# Patient Record
Sex: FEMALE | Race: WHITE | NOT HISPANIC OR LATINO
[De-identification: names, ages, dates, MRNs, and addresses within clinical notes are randomized per-mention and may not be internally consistent; named-entity substitution may affect disease eponyms.]

---

## 2020-01-23 PROBLEM — Z00.00 ENCOUNTER FOR PREVENTIVE HEALTH EXAMINATION: Status: ACTIVE | Noted: 2020-01-23

## 2020-01-27 ENCOUNTER — APPOINTMENT (OUTPATIENT)
Dept: RADIATION ONCOLOGY | Facility: CLINIC | Age: 75
End: 2020-01-27
Payer: MEDICARE

## 2020-01-27 VITALS
HEART RATE: 64 BPM | DIASTOLIC BLOOD PRESSURE: 88 MMHG | TEMPERATURE: 98 F | BODY MASS INDEX: 33.13 KG/M2 | HEIGHT: 62 IN | WEIGHT: 180 LBS | SYSTOLIC BLOOD PRESSURE: 138 MMHG | OXYGEN SATURATION: 99 % | RESPIRATION RATE: 12 BRPM

## 2020-01-27 DIAGNOSIS — Z87.39 PERSONAL HISTORY OF OTHER DISEASES OF THE MUSCULOSKELETAL SYSTEM AND CONNECTIVE TISSUE: ICD-10-CM

## 2020-01-27 DIAGNOSIS — Z85.118 PERSONAL HISTORY OF OTHER MALIGNANT NEOPLASM OF BRONCHUS AND LUNG: ICD-10-CM

## 2020-01-27 DIAGNOSIS — M19.90 UNSPECIFIED OSTEOARTHRITIS, UNSPECIFIED SITE: ICD-10-CM

## 2020-01-27 DIAGNOSIS — Z86.79 PERSONAL HISTORY OF OTHER DISEASES OF THE CIRCULATORY SYSTEM: ICD-10-CM

## 2020-01-27 DIAGNOSIS — Z86.2 PERSONAL HISTORY OF DISEASES OF THE BLOOD AND BLOOD-FORMING ORGANS AND CERTAIN DISORDERS INVOLVING THE IMMUNE MECHANISM: ICD-10-CM

## 2020-01-27 DIAGNOSIS — R91.1 SOLITARY PULMONARY NODULE: ICD-10-CM

## 2020-01-27 DIAGNOSIS — Z86.39 PERSONAL HISTORY OF OTHER ENDOCRINE, NUTRITIONAL AND METABOLIC DISEASE: ICD-10-CM

## 2020-01-27 PROCEDURE — 99204 OFFICE O/P NEW MOD 45 MIN: CPT | Mod: 25

## 2020-01-27 RX ORDER — AMOXICILLIN AND CLAVULANATE POTASSIUM 500; 125 MG/1; 1/1
TABLET, FILM COATED ORAL
Refills: 0 | Status: COMPLETED | COMMUNITY
End: 2020-01-27

## 2020-01-27 NOTE — REVIEW OF SYSTEMS
[Fatigue: Grade 1 - Fatigue relieved by rest] : Fatigue: Grade 1 - Fatigue relieved by rest [Breast Pain: Grade 0] : Breast Pain: Grade 0 [Cough: Grade 0] : Cough: Grade 0 [Dyspnea: Grade 1 - Shortness of breath with moderate exertion] : Dyspnea: Grade 1 - Shortness of breath with moderate exertion [Hiccups: Grade 0] : Hiccups: Grade 0 [Hoarseness: Grade 0] : Hoarseness: Grade 0 [Hypoxia: Grade 0] : Hypoxia: Grade 0 [Pharyngeal Mucositis: Grade 0] : Pharyngeal Mucositis: Grade 0 [Pneumonitis: Grade 0] : Pneumonitis: Grade 0 [Voice Alteration: Grade 0] : Voice Alteration: Grade 0 [Negative] : Allergic/Immunologic [FreeTextEntry4] : runny nose [FreeTextEntry9] : right-sided back pain [FreeTextEntry3] : "8/10" [FreeTextEntry2] : ELAINE

## 2020-01-27 NOTE — OB/GYN HISTORY
[Currently In Menopause] : currently in menopause [Menopause Age: ____] : patient was [unfilled] years old at menopause [___] : Living: [unfilled] [History of Hormone Replacement Therapy] : no history of hormone replacement therapy

## 2020-01-27 NOTE — DISEASE MANAGEMENT
[Clinical] : TNM Stage: c [I] : I [FreeTextEntry4] : TBD/pending surgical staging [TTNM] : 1 [NTNM] : 0 [MTNM] : 0

## 2020-01-27 NOTE — HISTORY OF PRESENT ILLNESS
[FreeTextEntry1] : Ms. Cordoba is a 74 year old woman with a history of chemotherapy for colonic ectasias with bleeding, lung cancer (pT2a 3.8 cm SCC, poorly differentiated) s/p RLL lobectomy in May 2019 now with an invasive lobular cancer of the right breast s/p biopsy pending surgical staging.\par \par 12/13/19- Routine mammogram- ordered by Primary MD- Lorraine Mir- noted to have suspicious lesion in right breast. Upper outer quadrant right breast, irregular nodule measures 0.7cm. BI-RADS- 0 - incomplete need additional imaging \par \par 12/26/19- Bilateral u/s revealed a 6mm hypoechoic lesion in the area of concern 10:00 in the right  6mm from the nipple\par \par 1/10/20- u/s guided core bx was performed- invasive lobular carcinoma, ER/MD +, HER-2 negative\par Right breast 10:00 6cm- invasive lobular carcinoma, classic type, grade 6/9, measures up to 6mm in greatest length in a single core\par \par 1/16/20 Dr. Woodard discussed with pt the options of breast conserving vs total mastectomy with or without reconstruction, and if reconstruction then implants vs tissue transfer. If margins were positive for partial mastectomy additional surgery would be required. Dr. Woodard  offered the patient a sentinel node biopsy procedure. If positive, for mastectomy, axillary dissection will occur. If positive for lumpectomy procedures no axillary dissection,since pt will get axillary radiation. \par \par 1/20/20- MRI- Abnormal suspicious enhancement  around the biopsy clip in the right breast 10:00 mid third measuring about 9mm.  Linear enhancement extending posteriorly along the inferior aspect of the nodule measuring 1.2cm. No lymphadenopathy. \par \par HX- lung cancer and RLL lobectomy 5/2019 by Dr. Hdz- Pt c/o pain '8/10" in right chest wall area when touching the area- taking low dose Naltrexone once a day. Pt stated Dr. Asif suggested chemotherapy- pt didn't'’t want to get it. \par \par Pt here today c/o "head cold," denies cough, no fever, runny nose. Pt concerned about being allowed for surgery Wednesday, pt called Dr. Woodard's office, waiting call back. Pt is scheduled for surgery Wednesday 1/29/20- Lumpectomy \par \par Pt c/o ELAINE since lunch cancer DX

## 2020-01-27 NOTE — PHYSICAL EXAM
[Normal] : oriented to person, place and time, the affect was normal, the mood was normal and not anxious [de-identified] : no lumps or masses appreciated in breasts bilaterally

## 2020-01-27 NOTE — VITALS
[Maximal Pain Intensity: 8/10] : 8/10 [Least Pain Intensity: 8/10] : 8/10 [Pain Location: ___] : Pain Location: [unfilled] [Pain Interferes with ADLs] : Pain interferes with activities of daily living. [80: Normal activity with effort; some signs or symptoms of disease.] : 80: Normal activity with effort; some signs or symptoms of disease.  [ECOG Performance Status: 1 - Restricted in physically strenuous activity but ambulatory and able to carry out work of a light or sedentary nature] : Performance Status: 1 - Restricted in physically strenuous activity but ambulatory and able to carry out work of a light or sedentary nature, e.g., light house work, office work [Date: ____________] : Patient's last distress assessment performed on [unfilled]. [9 - Distress Level] : Distress Level: 9 [80: Active, but tires more quickly] : 80: Active, but tires more quickly [FreeTextEntry7] : health and wellness information given to pt

## 2020-02-18 ENCOUNTER — APPOINTMENT (OUTPATIENT)
Dept: RADIATION ONCOLOGY | Facility: CLINIC | Age: 75
End: 2020-02-18
Payer: MEDICARE

## 2020-02-18 VITALS
HEIGHT: 62 IN | HEART RATE: 64 BPM | WEIGHT: 186 LBS | TEMPERATURE: 98.2 F | OXYGEN SATURATION: 99 % | BODY MASS INDEX: 34.23 KG/M2 | DIASTOLIC BLOOD PRESSURE: 88 MMHG | SYSTOLIC BLOOD PRESSURE: 158 MMHG | RESPIRATION RATE: 14 BRPM

## 2020-02-18 PROCEDURE — 99214 OFFICE O/P EST MOD 30 MIN: CPT

## 2020-02-18 NOTE — PHYSICAL EXAM
[Normal] : oriented to person, place and time, the affect was normal, the mood was normal and not anxious [Breast Appearance] : normal in appearance [Symmetric] : breasts are symmetric [Breast Abnormal Lactation (Galactorrhea)] : no nipple discharge [No UE Edema] : there is no upper extremity edema [de-identified] : right breast scars healing well

## 2020-02-18 NOTE — DISEASE MANAGEMENT
[Clinical] : TNM Stage: c [IB] : IB [FreeTextEntry4] : TBD/pending surgical staging [TTNM] : 1b [NTNM] : 1mi [MTNM] : 0

## 2020-02-18 NOTE — VITALS
[Maximal Pain Intensity: 8/10] : 8/10 [Pain Duration: ___] : Pain duration: [unfilled] [Least Pain Intensity: 8/10] : 8/10 [Pain Location: ___] : Pain Location: [unfilled] [NoTreatment Scheduled] : no treatment scheduled [80: Normal activity with effort; some signs or symptoms of disease.] : 80: Normal activity with effort; some signs or symptoms of disease.  [80: Active, but tires more quickly] : 80: Active, but tires more quickly [ECOG Performance Status: 2 - Ambulatory and capable of all self care but unable to carry out any work activities] : Performance Status: 2 - Ambulatory and capable of all self care but unable to carry out any work activities. Up and about more than 50% of waking hours

## 2020-02-18 NOTE — REVIEW OF SYSTEMS
[Fatigue: Grade 1 - Fatigue relieved by rest] : Fatigue: Grade 1 - Fatigue relieved by rest [Breast Pain: Grade 0] : Breast Pain: Grade 0 [Insomnia: Grade 1 - Mild difficulty falling asleep, staying asleep or waking up early] : Insomnia: Grade 1 - Mild difficulty falling asleep, staying asleep or waking up early [Cough: Grade 0] : Cough: Grade 0 [Dyspnea: Grade 1 - Shortness of breath with moderate exertion] : Dyspnea: Grade 1 - Shortness of breath with moderate exertion [Hiccups: Grade 0] : Hiccups: Grade 0 [Hoarseness: Grade 0] : Hoarseness: Grade 0 [Hypoxia: Grade 0] : Hypoxia: Grade 0 [Pharyngeal Mucositis: Grade 0] : Pharyngeal Mucositis: Grade 0 [Pneumonitis: Grade 0] : Pneumonitis: Grade 0 [Voice Alteration: Grade 1 - Mild or intermittent change from normal voice] : Voice Alteration: Grade 1 - Mild or intermittent change from normal voice [Pruritus: Grade 0] : Pruritus: Grade 0 [Fatigue] : fatigue [Negative] : Heme/Lymph [FreeTextEntry9] : right chest wall pain [FreeTextEntry3] : "7/10" [FreeTextEntry2] : ELAINE

## 2020-02-18 NOTE — HISTORY OF PRESENT ILLNESS
[FreeTextEntry1] : Ms. Cordoba is a 74 year old woman with a history of chemotherapy for colonic ectasias with bleeding, lung cancer (pT2a 3.8 cm SCC, poorly differentiated) s/p RLL lobectomy in May 2019 now with a stage Ib, [id number] invasive lobular carcinoma intermediate grade of the right breast.\par \par She had a CT Chest in February 2019 noting a 1.8 cm RLL nodule. She was noted to have a CT in April 2019 after abdominal pain noting a RLL nodule measuring 2.2 cm slightly increased from 1.8 cm in February. She underwent right lower lobectomy on 5/9/19 by Dr. Fuentes noting a 3.8 cm SCC of the right lower lobe, grade 3, with negative margins >4 cm (bronchial), and 5 nodes negative (levels 7, 10, 11, and 12).  \par \par Pt c/o pain '8/10" in right chest wall area when touching the area- taking low dose Naltrexone once a day. Pt stated Dr. Asif suggested chemotherapy- pt didn't want to get it. \par \par She underwent last mammo in March 2016.  Underwent screening mammogram on 12/13/19 noting an irregular nodule in the right upper outer quadrant measuring 0.7 cm that is new.\par \par Biopsy on 1/10/20 of the right breast 10 o’clock 6 cm from nipple noted invasive lobular carcinoma, calsscic type, grade 6/9, up to 6 mm in a single core, ER+/KS+/Her2 IHC equivocal on [facility] review.  Initial caremount noted invasive lobular carcinoma classic type, overall grade 2 up to 6 mm in a single core.\par \par MRI Breast on 1/20/20 noted right breast 10 o’clock axis mid third irregular enhancing lesion and/or post-biopsy change measuring 9 mm.  Along the posterior inferior aspect there is linear enhancement measuring 1.2 cm with clip in the middle of the nodule.  No lymphadenopathy was noted.\par \par \par 1/29/20- Dr. Woodard- Right Localized lumpectomy with additional margins and axillary sentinel lymph node biopsy\par FINAL PATHOLOGIC DIAGNOSIS\par A. RIGHT BREAST CANCER: LOCALIZED LUMPECTOMY\par -  INVASIVE LOBULAR CARCINOMA\par 	-  CLASSIC TYPE\par 	-  7 MM\par 	-  HISTOLOGIC GRADE 6/9 (TUBULES 3/3, NUCLEI 2/3, MITOSES 1/3)\par 	-  SINGLE FOCUS SUSPICIOUS FOR LYMPHOVASCULAR INVASION\par 	-  NEGATIVE MARGINS\par 	-  CLOSEST MARGIN: < 1 MM SUPERIOR\par -  REMAINING MARGINS: 7 MM LATERAL, 8 MM INFERIOR, > 10 MM ANTERIOR, POSTERIOR AND MEDIAL\par -  ASSOCIATED HEALING BIOPSY SITE CHANGES\par -  RESULTS OF E-CADHERIN STAIN TO SUPPORT LOBULAR ORIGIN WILL BE REPORTED IN AN ADDENDUM\par -  ER, KS , HER-2 AND KI-67 RESULTS WILL BE REPORTED IN AN ADDENDUM\par B. RIGHT BREAST SUPERIOR MARGIN (ADDITIONAL 1.2 CM MARGIN):\par -  LOBULAR CARCINOMA IN SITU (LCIS), CLASSIC TYPE, SMALL FOCUS\par -  HEALING BIOPSY SITE CHANGES, SMALL FOCUS\par -  NEGATIVE FOR INVASIVE CARCINOMA\par C. RIGHT BREAST MEDIAL MARGIN (ADDITIONAL 1.6 CM MARGIN):\par -  BENIGN UNREMARKABLE BREAST TISSUE.\par D. RIGHT BREAST INFERIOR MARGIN (ADDITIONAL 0.9 CM MARGIN):\par -  BENIGN UNREMARKABLE BREAST TISSUE.\par E. RIGHT BREAST LATERAL MARGIN (ADDITIONAL 1.5 CM MARGIN):\par -  BENIGN UNREMARKABLE BREAST TISSUE.\par F. RIGHT BREAST ANTERIOR MARGIN (ADDITIONAL 1.5 CM MARGIN):\par -  BENIGN UNREMARKABLE BREAST TISSUE.\par G. RIGHT BREAST POSTERIOR MARGIN (ADDITIONAL 1.5 CM MARGIN):\par -  BENIGN BREAST TISSUE WITH FOCAL FLAT EPITHELIAL ATYPIA (FEA).\par H. RIGHT AXILLARY SENTINEL NODE #1:\par -  MICROMETASTATIC CARCINOMA (1.1 MM FOCUS) IN A SINGLE 1.8 CM LYMPH NODE (1/1).\par -  NEGATIVE FOR EXTRANODAL EXTENSION.\par \par Cancer Staging \par Pathologic Stage Classification (AJCC 8th ed):  pT1b N1mi(sn) \par \par ADDENDUM 2\par Part H:  Right axillary sentinel lymph node #1\par Her-2 by FISH results on the metastatic carcinoma as reported by [facility] (See [facility] report for complete details)\par HER2 by FISH Negative/Not Amplified \par HER2:CEP-17 Ratio 1.0:1\par Average HER2 signal 2.3 \par \par ADDENDUM 1\par Results of immunohistochemical stains.  \par Part A:  Right breast cancer (Block A10)\par E-cadherin stain is negative, supporting lobular phenotype \par Estrogen receptor (ER) POSITIVE (>95%, strong intensity) \par Progesterone receptor (KS) POSITIVE (>95%, strong intensity) \par HER-2 NEGATIVE (score 1+) \par Ki-67 HIGH (~25%)\par \par Part H:  Micrometastatic carcinoma in right axillary sentinel lymph node (Block H1)\par Estrogen receptor (ER) POSITIVE (>95%, strong intensity)\par Progesterone receptor (KS) POSITIVE (>95%, strong intensity)\par HER-2  EQUIVOCAL (Score 1-2+)\par HER-2 by FISH pending\par Ki-67  LOW (< 10%) \par \par \par Here to discuss radiation post lumpectomy.  Is doing well.  Is pending oncotype/appointment with Dr. Asif.

## 2020-03-09 ENCOUNTER — APPOINTMENT (OUTPATIENT)
Dept: RADIATION ONCOLOGY | Facility: CLINIC | Age: 75
End: 2020-03-09
Payer: MEDICARE

## 2020-03-09 PROCEDURE — 99212 OFFICE O/P EST SF 10 MIN: CPT

## 2020-03-09 NOTE — HISTORY OF PRESENT ILLNESS
[FreeTextEntry1] : 74 year old woman with a history of chemotherapy for colonic ectasias with bleeding, lung cancer (pT2a 3.8 cm SCC, poorly differentiated) s/p RLL lobectomy in May 2019 now with a stage Ib, [id number] invasive lobular carcinoma intermediate grade of the right breast.  We had seen her 2 weeks ago and had gone over the pathology findings and had recommended radiation therapy to her right breast and to axilla to improve her local control.  We had initially favored treating her in prone position.  However patient had difficulty lying the position and the decision was made to treat her in supine position.  She was simulated in supine position.  Patient had concerns regarding her treatment in view of her prior history of lung cancer and has scheduled a visit to discuss her concerns.\par \par Pt here to further discuss treatment options/plan

## 2020-03-09 NOTE — PHYSICAL EXAM
[Normal] : oriented to person, place and time, the affect was normal, the mood was normal and not anxious [de-identified] : No erythema of the skin seen

## 2020-03-19 ENCOUNTER — APPOINTMENT (OUTPATIENT)
Dept: RADIATION ONCOLOGY | Facility: CLINIC | Age: 75
End: 2020-03-19

## 2020-03-24 VITALS
RESPIRATION RATE: 14 BRPM | DIASTOLIC BLOOD PRESSURE: 82 MMHG | BODY MASS INDEX: 34.04 KG/M2 | HEIGHT: 62 IN | HEART RATE: 68 BPM | WEIGHT: 185 LBS | SYSTOLIC BLOOD PRESSURE: 156 MMHG | OXYGEN SATURATION: 99 % | TEMPERATURE: 98.6 F

## 2020-03-24 NOTE — PHYSICAL EXAM
[Normal] : oriented to person, place and time, the affect was normal, the mood was normal and not anxious [de-identified] : No erythema seen

## 2020-03-24 NOTE — REVIEW OF SYSTEMS
[Fatigue: Grade 1 - Fatigue relieved by rest] : Fatigue: Grade 1 - Fatigue relieved by rest [Breast Pain: Grade 0] : Breast Pain: Grade 0 [Cough: Grade 0] : Cough: Grade 0 [Dyspnea: Grade 1 - Shortness of breath with moderate exertion] : Dyspnea: Grade 1 - Shortness of breath with moderate exertion [Hiccups: Grade 0] : Hiccups: Grade 0 [Hoarseness: Grade 0] : Hoarseness: Grade 0 [Hypoxia: Grade 0] : Hypoxia: Grade 0 [Pharyngeal Mucositis: Grade 0] : Pharyngeal Mucositis: Grade 0 [Pneumonitis: Grade 0] : Pneumonitis: Grade 0 [Voice Alteration: Grade 0] : Voice Alteration: Grade 0 [Pruritus: Grade 0] : Pruritus: Grade 0 [Dermatitis Radiation: Grade 0] : Dermatitis Radiation: Grade 0 [Negative] : Allergic/Immunologic [FreeTextEntry2] : ELAINE  [FreeTextEntry4] : runny nose [FreeTextEntry9] : right-sided back pain

## 2020-03-24 NOTE — DISEASE MANAGEMENT
[Clinical] : TNM Stage: c [I] : I [FreeTextEntry4] : TBD/pending surgical staging [TTNM] : 1 [NTNM] : 0 [MTNM] : 0 [de-identified] : completed 2 out of 25 fractions with a total dose of 400 cGy

## 2020-03-24 NOTE — HISTORY OF PRESENT ILLNESS
[FreeTextEntry1] : Ms. Cordoba is a 74 year old woman with a history of chemotherapy for colonic ectasias with bleeding, lung cancer (pT2a 3.8 cm SCC, poorly differentiated) s/p RLL lobectomy in May 2019 now with an invasive lobular cancer of the right breast s/p biopsy pending surgical staging.\par \par 12/13/19- Routine mammogram- ordered by Primary MD- Lorraine Mir- noted to have suspicious lesion in right breast. Upper outer quadrant right breast, irregular nodule measures 0.7cm. BI-RADS- 0 - incomplete need additional imaging \par \par 12/26/19- Bilateral u/s revealed a 6mm hypoechoic lesion in the area of concern 10:00 in the right  6mm from the nipple\par \par 1/10/20- u/s guided core bx was performed- invasive lobular carcinoma, ER/MI +, HER-2 negative\par Right breast 10:00 6cm- invasive lobular carcinoma, classic type, grade 6/9, measures up to 6mm in greatest length in a single core\par \par 1/16/20 Dr. Woodard discussed with pt the options of breast conserving vs total mastectomy with or without reconstruction, and if reconstruction then implants vs tissue transfer. If margins were positive for partial mastectomy additional surgery would be required. Dr. Woodard  offered the patient a sentinel node biopsy procedure. If positive, for mastectomy, axillary dissection will occur. If positive for lumpectomy procedures no axillary dissection,since pt will get axillary radiation. \par \par 1/20/20- MRI- Abnormal suspicious enhancement  around the biopsy clip in the right breast 10:00 mid third measuring about 9mm.  Linear enhancement extending posteriorly along the inferior aspect of the nodule measuring 1.2cm. No lymphadenopathy. \par \par HX- lung cancer and RLL lobectomy 5/2019 by Dr. Hdz- Pt c/o pain '8/10" in right chest wall area when touching the area- taking low dose Naltrexone once a day. Pt stated Dr. Asif suggested chemotherapy- pt didn't'’t want to get it. \par \par pt presents today for a OTV. She is feeling well just c/o right shoulder pain possibly due to positioning on the table. pt using cetaphil cream BID \par \par

## 2020-03-24 NOTE — VITALS
[Maximal Pain Intensity: 0/10] : 0/10 [Least Pain Intensity: 0/10] : 0/10 [Pain Location: ___] : Pain Location: [unfilled] [Pain Interferes with ADLs] : Pain interferes with activities of daily living. [OTC] : OTC [80: Normal activity with effort; some signs or symptoms of disease.] : 80: Normal activity with effort; some signs or symptoms of disease.  [80: Active, but tires more quickly] : 80: Active, but tires more quickly [ECOG Performance Status: 1 - Restricted in physically strenuous activity but ambulatory and able to carry out work of a light or sedentary nature] : Performance Status: 1 - Restricted in physically strenuous activity but ambulatory and able to carry out work of a light or sedentary nature, e.g., light house work, office work [Date: ____________] : Patient's last distress assessment performed on [unfilled]. [9 - Distress Level] : Distress Level: 9 [FreeTextEntry7] : health and wellness information given to pt

## 2020-03-31 VITALS
OXYGEN SATURATION: 96 % | BODY MASS INDEX: 34.41 KG/M2 | HEART RATE: 66 BPM | HEIGHT: 62 IN | TEMPERATURE: 98.5 F | RESPIRATION RATE: 14 BRPM | DIASTOLIC BLOOD PRESSURE: 83 MMHG | WEIGHT: 187 LBS | SYSTOLIC BLOOD PRESSURE: 157 MMHG

## 2020-03-31 NOTE — PHYSICAL EXAM
[Normal] : oriented to person, place and time, the affect was normal, the mood was normal and not anxious [de-identified] : Erythema of the breast noted

## 2020-03-31 NOTE — HISTORY OF PRESENT ILLNESS
[FreeTextEntry1] : Ms. Cordoba is a 74 year old woman with a history of chemotherapy for colonic ectasias with bleeding, lung cancer (pT2a 3.8 cm SCC, poorly differentiated) s/p RLL lobectomy in May 2019 now with an invasive lobular cancer of the right breast s/p biopsy pending surgical staging.\par \par 12/13/19- Routine mammogram- ordered by Primary MD- Lorraine Mir- noted to have suspicious lesion in right breast. Upper outer quadrant right breast, irregular nodule measures 0.7cm. BI-RADS- 0 - incomplete need additional imaging \par \par 12/26/19- Bilateral u/s revealed a 6mm hypoechoic lesion in the area of concern 10:00 in the right  6mm from the nipple\par \par 1/10/20- u/s guided core bx was performed- invasive lobular carcinoma, ER/MA +, HER-2 negative\par Right breast 10:00 6cm- invasive lobular carcinoma, classic type, grade 6/9, measures up to 6mm in greatest length in a single core\par \par 1/16/20 Dr. Woodard discussed with pt the options of breast conserving vs total mastectomy with or without reconstruction, and if reconstruction then implants vs tissue transfer. If margins were positive for partial mastectomy additional surgery would be required. Dr. Woodard  offered the patient a sentinel node biopsy procedure. If positive, for mastectomy, axillary dissection will occur. If positive for lumpectomy procedures no axillary dissection,since pt will get axillary radiation. \par \par 1/20/20- MRI- Abnormal suspicious enhancement  around the biopsy clip in the right breast 10:00 mid third measuring about 9mm.  Linear enhancement extending posteriorly along the inferior aspect of the nodule measuring 1.2cm. No lymphadenopathy. \par \par HX- lung cancer and RLL lobectomy 5/2019 by Dr. Hdz- Pt c/o pain '8/10" in right chest wall area when touching the area- taking low dose Naltrexone once a day. Pt stated Dr. Asif suggested chemotherapy- pt didn't'’t want to get it. \par \par pt presents today for a OTV. She is feeling well just c/o some right breast soreness and inflammation. Her skin is slightly red today.  pt using cetaphil cream BID. Is c/o of a mild headache the last couple treatments. \par \par

## 2020-03-31 NOTE — REVIEW OF SYSTEMS
[Fatigue: Grade 1 - Fatigue relieved by rest] : Fatigue: Grade 1 - Fatigue relieved by rest [Breast Pain: Grade 1 - Mild pain] : Breast Pain: Grade 1 - Mild pain [Headache: Grade 1 - Mild pain] : Headache: Grade 1 - Mild pain [Cough: Grade 0] : Cough: Grade 0 [Dyspnea: Grade 1 - Shortness of breath with moderate exertion] : Dyspnea: Grade 1 - Shortness of breath with moderate exertion [Hiccups: Grade 0] : Hiccups: Grade 0 [Hoarseness: Grade 0] : Hoarseness: Grade 0 [Hypoxia: Grade 0] : Hypoxia: Grade 0 [Pharyngeal Mucositis: Grade 0] : Pharyngeal Mucositis: Grade 0 [Pneumonitis: Grade 0] : Pneumonitis: Grade 0 [Voice Alteration: Grade 0] : Voice Alteration: Grade 0 [Pruritus: Grade 0] : Pruritus: Grade 0 [Dermatitis Radiation: Grade 1 - Faint erythema or dry desquamation] : Dermatitis Radiation: Grade 1 - Faint erythema or dry desquamation [Negative] : Allergic/Immunologic [FreeTextEntry7] : slight soreness and inflammation  [FreeTextEntry2] : ELAINE  [FreeTextEntry6] : slight redness  [FreeTextEntry4] : runny nose [FreeTextEntry9] : right-sided back pain

## 2020-03-31 NOTE — DISEASE MANAGEMENT
[Clinical] : TNM Stage: c [I] : I [FreeTextEntry4] : TBD/pending surgical staging [TTNM] : 1 [NTNM] : 0 [MTNM] : 0 [de-identified] : completed 7 out of 25 fractions with a total dose of 1400 cGy

## 2020-04-07 VITALS
OXYGEN SATURATION: 98 % | HEIGHT: 62 IN | HEART RATE: 68 BPM | DIASTOLIC BLOOD PRESSURE: 90 MMHG | BODY MASS INDEX: 33.86 KG/M2 | WEIGHT: 184 LBS | TEMPERATURE: 98 F | SYSTOLIC BLOOD PRESSURE: 138 MMHG | RESPIRATION RATE: 12 BRPM

## 2020-04-07 NOTE — VITALS
[Maximal Pain Intensity: 0/10] : 0/10 [Least Pain Intensity: 0/10] : 0/10 [NoTreatment Scheduled] : no treatment scheduled [80: Normal activity with effort; some signs or symptoms of disease.] : 80: Normal activity with effort; some signs or symptoms of disease.  [80: Active, but tires more quickly] : 80: Active, but tires more quickly [ECOG Performance Status: 1 - Restricted in physically strenuous activity but ambulatory and able to carry out work of a light or sedentary nature] : Performance Status: 1 - Restricted in physically strenuous activity but ambulatory and able to carry out work of a light or sedentary nature, e.g., light house work, office work [Date: ____________] : Patient's last distress assessment performed on [unfilled]. [2 - Distress Level] : Distress Level: 2

## 2020-04-07 NOTE — PHYSICAL EXAM
[Breast Appearance] : normal in appearance [Symmetric] : breasts are symmetric [Breast Palpation Mass] : no palpable masses [Breast Abnormal Lactation (Galactorrhea)] : no nipple discharge [No UE Edema] : there is no upper extremity edema [Normal] : oriented to person, place and time, the affect was normal, the mood was normal and not anxious [de-identified] : Mild erythema of right breast

## 2020-04-07 NOTE — DISEASE MANAGEMENT
[Clinical] : TNM Stage: c [I] : I [FreeTextEntry4] : TBD/pending surgical staging [TTNM] : 1 [NTNM] : 0 [MTNM] : 0 [de-identified] : Completed 12 out of 25 fractions with a total dose of 2400 cGy

## 2020-04-07 NOTE — HISTORY OF PRESENT ILLNESS
[FreeTextEntry1] : Ms. Cordoba is a 74 year old woman with a history of chemotherapy for colonic ectasias with bleeding, lung cancer (pT2a 3.8 cm SCC, poorly differentiated) s/p RLL lobectomy in May 2019 now with an invasive lobular cancer of the right breast s/p biopsy pending surgical staging.\par \par 12/13/19- Routine mammogram- ordered by Primary MD- Lorraine Mir- noted to have suspicious lesion in right breast. Upper outer quadrant right breast, irregular nodule measures 0.7cm. BI-RADS- 0 - incomplete need additional imaging \par \par 12/26/19- Bilateral u/s revealed a 6mm hypoechoic lesion in the area of concern 10:00 in the right  6mm from the nipple\par \par 1/10/20- u/s guided core bx was performed- invasive lobular carcinoma, ER/[place] +, HER-2 negative\par Right breast 10:00 6cm- invasive lobular carcinoma, classic type, grade 6/9, measures up to 6mm in greatest length in a single core\par \par 1/16/20 Dr. Woodard discussed with pt the options of breast conserving vs total mastectomy with or without reconstruction, and if reconstruction then implants vs tissue transfer. If margins were positive for partial mastectomy additional surgery would be required. Dr. Woodard  offered the patient a sentinel node biopsy procedure. If positive, for mastectomy, axillary dissection will occur. If positive for lumpectomy procedures no axillary dissection,since pt will get axillary radiation. \par \par 1/20/20- MRI- Abnormal suspicious enhancement  around the biopsy clip in the right breast 10:00 mid third measuring about 9mm.  Linear enhancement extending posteriorly along the inferior aspect of the nodule measuring 1.2cm. No lymphadenopathy. \par \par HX- lung cancer and RLL lobectomy 5/2019 by Dr. Hdz- Pt c/o pain '8/10" in right chest wall area when touching the area- taking low dose Naltrexone once a day. Pt stated Dr. Asif suggested chemotherapy- pt didn't want to get it. \par ___\par \par Pt presents today for a OTV.  C/O some some right breast soreness which has not changed since last week. Pt using Cetaphil cream BID, encouraged her to try calendula also.  Occasional c/o mild h/a, not today yet. . \par \par

## 2020-04-07 NOTE — REVIEW OF SYSTEMS
[Fatigue: Grade 1 - Fatigue relieved by rest] : Fatigue: Grade 1 - Fatigue relieved by rest [Breast Pain: Grade 1 - Mild pain] : Breast Pain: Grade 1 - Mild pain [Headache: Grade 1 - Mild pain] : Headache: Grade 1 - Mild pain [Cough: Grade 0] : Cough: Grade 0 [Dyspnea: Grade 1 - Shortness of breath with moderate exertion] : Dyspnea: Grade 1 - Shortness of breath with moderate exertion [Hiccups: Grade 0] : Hiccups: Grade 0 [Hoarseness: Grade 0] : Hoarseness: Grade 0 [Hypoxia: Grade 0] : Hypoxia: Grade 0 [Pharyngeal Mucositis: Grade 0] : Pharyngeal Mucositis: Grade 0 [Pneumonitis: Grade 0] : Pneumonitis: Grade 0 [Voice Alteration: Grade 0] : Voice Alteration: Grade 0 [Alopecia: Grade 0] : Alopecia: Grade 0 [Pruritus: Grade 1 - Mild or localized; topical intervention indicated] : Pruritus: Grade 1 - Mild or localized; topical intervention indicated [Skin Atrophy: Grade 0] : Skin Atrophy: Grade 0 [Skin Hyperpigmentation: Grade 0] : Skin Hyperpigmentation: Grade 0 [Skin Induration: Grade 0] : Skin Induration: Grade 0 [Dermatitis Radiation: Grade 1 - Faint erythema or dry desquamation] : Dermatitis Radiation: Grade 1 - Faint erythema or dry desquamation [FreeTextEntry7] : slight soreness [FreeTextEntry2] : ELAINE  [FreeTextEntry6] : slight redness

## 2020-04-14 VITALS
WEIGHT: 185 LBS | HEART RATE: 67 BPM | TEMPERATURE: 96.1 F | DIASTOLIC BLOOD PRESSURE: 77 MMHG | OXYGEN SATURATION: 97 % | SYSTOLIC BLOOD PRESSURE: 151 MMHG | BODY MASS INDEX: 34.04 KG/M2 | HEIGHT: 62 IN | RESPIRATION RATE: 14 BRPM

## 2020-04-14 NOTE — PHYSICAL EXAM
[Breast Appearance] : normal in appearance [Symmetric] : breasts are symmetric [Breast Palpation Mass] : no palpable masses [Breast Abnormal Lactation (Galactorrhea)] : no nipple discharge [No UE Edema] : there is no upper extremity edema [Normal] : oriented to person, place and time, the affect was normal, the mood was normal and not anxious [de-identified] : Brisk erythema of right breast.  No desquamation seen

## 2020-04-14 NOTE — REVIEW OF SYSTEMS
[Fatigue: Grade 1 - Fatigue relieved by rest] : Fatigue: Grade 1 - Fatigue relieved by rest [Breast Pain: Grade 1 - Mild pain] : Breast Pain: Grade 1 - Mild pain [Headache: Grade 1 - Mild pain] : Headache: Grade 1 - Mild pain [Cough: Grade 0] : Cough: Grade 0 [Dyspnea: Grade 1 - Shortness of breath with moderate exertion] : Dyspnea: Grade 1 - Shortness of breath with moderate exertion [Hiccups: Grade 0] : Hiccups: Grade 0 [Hoarseness: Grade 0] : Hoarseness: Grade 0 [Hypoxia: Grade 0] : Hypoxia: Grade 0 [Pharyngeal Mucositis: Grade 0] : Pharyngeal Mucositis: Grade 0 [Pneumonitis: Grade 0] : Pneumonitis: Grade 0 [Voice Alteration: Grade 0] : Voice Alteration: Grade 0 [Alopecia: Grade 0] : Alopecia: Grade 0 [Pruritus: Grade 1 - Mild or localized; topical intervention indicated] : Pruritus: Grade 1 - Mild or localized; topical intervention indicated [Skin Atrophy: Grade 0] : Skin Atrophy: Grade 0 [Skin Hyperpigmentation: Grade 0] : Skin Hyperpigmentation: Grade 0 [Skin Induration: Grade 0] : Skin Induration: Grade 0 [Dermatitis Radiation: Grade 1 - Faint erythema or dry desquamation] : Dermatitis Radiation: Grade 1 - Faint erythema or dry desquamation [FreeTextEntry7] : slight soreness right breast [FreeTextEntry2] : ELAINE  [FreeTextEntry6] : increased  redness

## 2020-04-14 NOTE — HISTORY OF PRESENT ILLNESS
[FreeTextEntry1] : Ms. Cordoba is a 74 year old woman with a history of chemotherapy for colonic ectasias with bleeding, lung cancer (pT2a 3.8 cm SCC, poorly differentiated) s/p RLL lobectomy in May 2019 now with an invasive lobular cancer of the right breast s/p biopsy pending surgical staging.\par \par 12/13/19- Routine mammogram- ordered by Primary MD- Lorraine Mir- noted to have suspicious lesion in right breast. Upper outer quadrant right breast, irregular nodule measures 0.7cm. BI-RADS- 0 - incomplete need additional imaging \par \par 12/26/19- Bilateral u/s revealed a 6mm hypoechoic lesion in the area of concern 10:00 in the right  6mm from the nipple\par \par 1/10/20- u/s guided core bx was performed- invasive lobular carcinoma, ER/[place] +, HER-2 negative\par Right breast 10:00 6cm- invasive lobular carcinoma, classic type, grade 6/9, measures up to 6mm in greatest length in a single core\par \par 1/16/20 Dr. Woodard discussed with pt the options of breast conserving vs total mastectomy with or without reconstruction, and if reconstruction then implants vs tissue transfer. If margins were positive for partial mastectomy additional surgery would be required. Dr. Woodard  offered the patient a sentinel node biopsy procedure. If positive, for mastectomy, axillary dissection will occur. If positive for lumpectomy procedures no axillary dissection,since pt will get axillary radiation. \par \par 1/20/20- MRI- Abnormal suspicious enhancement  around the biopsy clip in the right breast 10:00 mid third measuring about 9mm.  Linear enhancement extending posteriorly along the inferior aspect of the nodule measuring 1.2cm. No lymphadenopathy. \par \par HX- lung cancer and RLL lobectomy 5/2019 by Dr. Hdz- Pt c/o pain '8/10" in right chest wall area when touching the area- taking low dose Naltrexone once a day. Pt stated Dr. Asif suggested chemotherapy- pt didn't want to get it. \par ___\par \par Pt presents today for a OTV.  C/O some  right breast soreness and redness of the skin.  which has not changed since last week. Pt using Cetaphil cream BID, encouraged her to try calendula also.  \par \par

## 2020-04-14 NOTE — DISEASE MANAGEMENT
[Clinical] : TNM Stage: c [I] : I [FreeTextEntry4] : TBD/pending surgical staging [TTNM] : 1 [NTNM] : 0 [MTNM] : 0 [de-identified] : Completed 17 out of 25 fractions with a total dose of 3400 cGy

## 2020-04-21 VITALS
BODY MASS INDEX: 34.23 KG/M2 | HEART RATE: 64 BPM | HEIGHT: 62 IN | SYSTOLIC BLOOD PRESSURE: 150 MMHG | WEIGHT: 186 LBS | TEMPERATURE: 97.6 F | DIASTOLIC BLOOD PRESSURE: 76 MMHG | RESPIRATION RATE: 16 BRPM | OXYGEN SATURATION: 97 %

## 2020-04-21 NOTE — HISTORY OF PRESENT ILLNESS
[FreeTextEntry1] : Ms. Cordoba is a 74 year old woman with a history of chemotherapy for colonic ectasias with bleeding, lung cancer (pT2a 3.8 cm SCC, poorly differentiated) s/p RLL lobectomy in May 2019 now with an invasive lobular cancer of the right breast s/p biopsy pending surgical staging.\par \par 12/13/19- Routine mammogram- ordered by Primary MD- Lorraine Mir- noted to have suspicious lesion in right breast. Upper outer quadrant right breast, irregular nodule measures 0.7cm. BI-RADS- 0 - incomplete need additional imaging \par \par 12/26/19- Bilateral u/s revealed a 6mm hypoechoic lesion in the area of concern 10:00 in the right  6mm from the nipple\par \par 1/10/20- u/s guided core bx was performed- invasive lobular carcinoma, ER/[place] +, HER-2 negative\par Right breast 10:00 6cm- invasive lobular carcinoma, classic type, grade 6/9, measures up to 6mm in greatest length in a single core\par \par 1/16/20 Dr. Woodard discussed with pt the options of breast conserving vs total mastectomy with or without reconstruction, and if reconstruction then implants vs tissue transfer. If margins were positive for partial mastectomy additional surgery would be required. Dr. Woodard  offered the patient a sentinel node biopsy procedure. If positive, for mastectomy, axillary dissection will occur. If positive for lumpectomy procedures no axillary dissection,since pt will get axillary radiation. \par \par 1/20/20- MRI- Abnormal suspicious enhancement  around the biopsy clip in the right breast 10:00 mid third measuring about 9mm.  Linear enhancement extending posteriorly along the inferior aspect of the nodule measuring 1.2cm. No lymphadenopathy. \par \par HX- lung cancer and RLL lobectomy 5/2019 by Dr. Hdz- Pt c/o pain '8/10" in right chest wall area when touching the area- taking low dose Naltrexone once a day. Pt stated Dr. Asif suggested chemotherapy- pt didn't want to get it. \par ___\par \par Pt presents today for a OTV.  C/O some  right breast soreness and increased  redness of the skin with some slight open area under the breast. Pt using Cetaphil cream BID and calendula. I have recommended using aquaphor under the breast at night with some gauze. Pt verbalized understanding. Pt to have conversation with Dr. Escobar today regarding concerns about  radiation accumulation over the past 2 years and to discuss her boost treatment next week. \par \par

## 2020-04-21 NOTE — PHYSICAL EXAM
[Breast Appearance] : normal in appearance [Symmetric] : breasts are symmetric [Breast Palpation Mass] : no palpable masses [Breast Abnormal Lactation (Galactorrhea)] : no nipple discharge [No UE Edema] : there is no upper extremity edema [Normal] : oriented to person, place and time, the affect was normal, the mood was normal and not anxious [de-identified] : Brisk erythema of the right breast.  No desquamation seen

## 2020-04-21 NOTE — DISEASE MANAGEMENT
[Clinical] : TNM Stage: c [I] : I [FreeTextEntry4] : TBD/pending surgical staging [TTNM] : 1 [NTNM] : 0 [de-identified] : Completed 22 out of 25 fractions with a total dose of 4400 cGy  [MTNM] : 0

## 2020-04-21 NOTE — VITALS
[Least Pain Intensity: 0/10] : 0/10 [Maximal Pain Intensity: 0/10] : 0/10 [NoTreatment Scheduled] : no treatment scheduled [80: Normal activity with effort; some signs or symptoms of disease.] : 80: Normal activity with effort; some signs or symptoms of disease.  [80: Active, but tires more quickly] : 80: Active, but tires more quickly [ECOG Performance Status: 1 - Restricted in physically strenuous activity but ambulatory and able to carry out work of a light or sedentary nature] : Performance Status: 1 - Restricted in physically strenuous activity but ambulatory and able to carry out work of a light or sedentary nature, e.g., light house work, office work [Date: ____________] : Patient's last distress assessment performed on [unfilled]. [2 - Distress Level] : Distress Level: 2

## 2020-04-27 VITALS
OXYGEN SATURATION: 98 % | SYSTOLIC BLOOD PRESSURE: 148 MMHG | HEIGHT: 62 IN | WEIGHT: 186 LBS | DIASTOLIC BLOOD PRESSURE: 74 MMHG | HEART RATE: 66 BPM | TEMPERATURE: 97 F | BODY MASS INDEX: 34.23 KG/M2 | RESPIRATION RATE: 12 BRPM

## 2020-04-27 NOTE — HISTORY OF PRESENT ILLNESS
[FreeTextEntry1] : Ms. Cordoba is a 74 year old woman with a history of chemotherapy for colonic ectasias with bleeding, lung cancer (pT2a 3.8 cm SCC, poorly differentiated) s/p RLL lobectomy in May 2019 now with an invasive lobular cancer of the right breast s/p biopsy pending surgical staging.\par \par 12/13/19- Routine mammogram- ordered by Primary MD- Lorraine Mir- noted to have suspicious lesion in right breast. Upper outer quadrant right breast, irregular nodule measures 0.7cm. BI-RADS- 0 - incomplete need additional imaging \par \par 12/26/19- Bilateral u/s revealed a 6mm hypoechoic lesion in the area of concern 10:00 in the right  6mm from the nipple\par \par 1/10/20- u/s guided core bx was performed- invasive lobular carcinoma, ER/[place] +, HER-2 negative\par Right breast 10:00 6cm- invasive lobular carcinoma, classic type, grade 6/9, measures up to 6mm in greatest length in a single core\par \par 1/16/20 Dr. Woodard discussed with pt the options of breast conserving vs total mastectomy with or without reconstruction, and if reconstruction then implants vs tissue transfer. If margins were positive for partial mastectomy additional surgery would be required. Dr. Woodard  offered the patient a sentinel node biopsy procedure. If positive, for mastectomy, axillary dissection will occur. If positive for lumpectomy procedures no axillary dissection,since pt will get axillary radiation. \par \par 1/20/20- MRI- Abnormal suspicious enhancement  around the biopsy clip in the right breast 10:00 mid third measuring about 9mm.  Linear enhancement extending posteriorly along the inferior aspect of the nodule measuring 1.2cm. No lymphadenopathy. \par \par HX- lung cancer and RLL lobectomy 5/2019 by Dr. Hdz- Pt c/o pain '8/10" in right chest wall area when touching the area- taking low dose Naltrexone once a day. Pt stated Dr. Asif suggested chemotherapy- pt didn't want to get it. \par ___________________________________________________________\par \par Pt presents today with c/o tenderness in her right breast. Right breast with redness of the skin with some slight open area under the breast, no blistering present. Pt using calendula BID currently. Pt expressed a concern about starting her boost treatment today. \par

## 2020-04-27 NOTE — PHYSICAL EXAM
[Breast Palpation Mass] : no palpable masses [Breast Abnormal Lactation (Galactorrhea)] : no nipple discharge [Symmetric] : breasts are symmetric [No UE Edema] : there is no upper extremity edema [de-identified] : Brisk erythema of right breast.  Small area of moist desquamation in right inframammary fold [Normal] : oriented to person, place and time, the affect was normal, the mood was normal and not anxious

## 2020-04-27 NOTE — VITALS
[Maximal Pain Intensity: 2/10] : 2/10 [Pain Description/Quality: ___] : Pain description/quality: [unfilled] [Pain Duration: ___] : Pain duration: [unfilled] [Least Pain Intensity: 2/10] : 2/10 [Pain Location: ___] : Pain Location: [unfilled] [Pain Interferes with ADLs] : Pain interferes with activities of daily living. [NoTreatment Scheduled] : no treatment scheduled [80: Normal activity with effort; some signs or symptoms of disease.] : 80: Normal activity with effort; some signs or symptoms of disease.  [ECOG Performance Status: 1 - Restricted in physically strenuous activity but ambulatory and able to carry out work of a light or sedentary nature] : Performance Status: 1 - Restricted in physically strenuous activity but ambulatory and able to carry out work of a light or sedentary nature, e.g., light house work, office work [80: Active, but tires more quickly] : 80: Active, but tires more quickly [Date: ____________] : Patient's last distress assessment performed on [unfilled]. [0 - No Distress] : Distress Level: 0

## 2020-04-27 NOTE — DISEASE MANAGEMENT
[Clinical] : TNM Stage: c [I] : I [FreeTextEntry4] : TBD/pending surgical staging [TTNM] : 1 [de-identified] : Completed 25 out of 25 fractions with a total dose of 5000 cGy  [NTNM] : 0 [MTNM] : 0

## 2020-04-27 NOTE — REVIEW OF SYSTEMS
[Fatigue: Grade 1 - Fatigue relieved by rest] : Fatigue: Grade 1 - Fatigue relieved by rest [Breast Pain: Grade 1 - Mild pain] : Breast Pain: Grade 1 - Mild pain [Headache: Grade 1 - Mild pain] : Headache: Grade 1 - Mild pain [Dyspnea: Grade 1 - Shortness of breath with moderate exertion] : Dyspnea: Grade 1 - Shortness of breath with moderate exertion [Cough: Grade 0] : Cough: Grade 0 [Hiccups: Grade 0] : Hiccups: Grade 0 [Hoarseness: Grade 0] : Hoarseness: Grade 0 [Hypoxia: Grade 0] : Hypoxia: Grade 0 [Pharyngeal Mucositis: Grade 0] : Pharyngeal Mucositis: Grade 0 [Voice Alteration: Grade 0] : Voice Alteration: Grade 0 [Pneumonitis: Grade 0] : Pneumonitis: Grade 0 [Pruritus: Grade 1 - Mild or localized; topical intervention indicated] : Pruritus: Grade 1 - Mild or localized; topical intervention indicated [Alopecia: Grade 0] : Alopecia: Grade 0 [Skin Atrophy: Grade 0] : Skin Atrophy: Grade 0 [Skin Hyperpigmentation: Grade 1 - Hyperpigmentation covering <10% BSA; no psychosocial impact] : Skin Hyperpigmentation: Grade 1 - Hyperpigmentation covering <10% BSA; no psychosocial impact [Dermatitis Radiation: Grade 2 - Moderate to brisk erythema; patchy moist desquamation, mostly confined to skin folds and creases; moderate edema] : Dermatitis Radiation: Grade 2 - Moderate to brisk erythema; patchy moist desquamation, mostly confined to skin folds and creases; moderate edema [Skin Induration: Grade 0] : Skin Induration: Grade 0 [FreeTextEntry7] : slight soreness right breast [FreeTextEntry2] : hydrocortisone cream

## 2020-05-01 VITALS
SYSTOLIC BLOOD PRESSURE: 148 MMHG | RESPIRATION RATE: 14 BRPM | HEART RATE: 70 BPM | BODY MASS INDEX: 34.04 KG/M2 | DIASTOLIC BLOOD PRESSURE: 78 MMHG | TEMPERATURE: 98 F | HEIGHT: 62 IN | WEIGHT: 185 LBS | OXYGEN SATURATION: 98 %

## 2020-05-01 NOTE — VITALS
[Maximal Pain Intensity: 8/10] : 8/10 [Least Pain Intensity: 8/10] : 8/10 [Pain Description/Quality: ___] : Pain description/quality: [unfilled] [Pain Duration: ___] : Pain duration: [unfilled] [Pain Location: ___] : Pain Location: [unfilled] [Pain Interferes with ADLs] : Pain interferes with activities of daily living. [80: Normal activity with effort; some signs or symptoms of disease.] : 80: Normal activity with effort; some signs or symptoms of disease.  [OTC] : OTC [80: Active, but tires more quickly] : 80: Active, but tires more quickly [ECOG Performance Status: 1 - Restricted in physically strenuous activity but ambulatory and able to carry out work of a light or sedentary nature] : Performance Status: 1 - Restricted in physically strenuous activity but ambulatory and able to carry out work of a light or sedentary nature, e.g., light house work, office work [0 - No Distress] : Distress Level: 0 [Date: ____________] : Patient's last distress assessment performed on [unfilled].

## 2020-05-04 NOTE — HISTORY OF PRESENT ILLNESS
[FreeTextEntry1] : \par 5/1/20- Pt called to inform us that her skin redness/ irritation is worsening. Advised her to come in for an evaluation. Erythema present, slight warmth in her breast (right side), sloughing skin under her right breast. Pt has been using calendula on her whole breast BID and Xeroform, gauze under her breast (without Aquaphor due to her feeling she had a reaction to the Aquaphor in the past before this treatment). Pt had blistering under her skin she stated, which are open now. She states she has tightness and soreness and decreased ROM in her right arm and shoulder.\par __________________________________________________________________________________________________\par  Ms. Cordoba is a 74 year old woman with a history of chemotherapy for colonic ectasias with bleeding, lung cancer (pT2a 3.8 cm SCC, poorly differentiated) s/p RLL lobectomy in May 2019 now with an invasive lobular cancer of the right breast s/p biopsy pending surgical staging.\par 12/13/19- Routine mammogram- ordered by Primary MD- Lorraine Mir- noted to have suspicious lesion in right breast. Upper outer quadrant right breast, irregular nodule measures 0.7cm. BI-RADS- 0 - incomplete need additional imaging \par \par 12/26/19- Bilateral u/s revealed a 6mm hypoechoic lesion in the area of concern 10:00 in the right  6mm from the nipple\par \par 1/10/20- u/s guided core bx was performed- invasive lobular carcinoma, ER/[place] +, HER-2 negative\par Right breast 10:00 6cm- invasive lobular carcinoma, classic type, grade 6/9, measures up to 6mm in greatest length in a single core\par \par 1/16/20 Dr. Woodard discussed with pt the options of breast conserving vs total mastectomy with or without reconstruction, and if reconstruction then implants vs tissue transfer. If margins were positive for partial mastectomy additional surgery would be required. Dr. Woodard  offered the patient a sentinel node biopsy procedure. If positive, for mastectomy, axillary dissection will occur. If positive for lumpectomy procedures no axillary dissection,since pt will get axillary radiation. \par \par 1/20/20- MRI- Abnormal suspicious enhancement  around the biopsy clip in the right breast 10:00 mid third measuring about 9mm.  Linear enhancement extending posteriorly along the inferior aspect of the nodule measuring 1.2cm. No lymphadenopathy. \par \par HX- lung cancer and RLL lobectomy 5/2019 by Dr. Hdz- Pt c/o pain '8/10" in right chest wall area when touching the area- taking low dose Naltrexone once a day. Pt stated Dr. Asif suggested chemotherapy- pt didn't want to get it. \par ___________________________________________________________\par \par 4/27/20 Pt presents today with c/o tenderness in her right breast. Right breast with redness of the skin with some slight open area under the breast, no blistering present. Pt using calendula BID currently. Pt expressed a concern about starting her boost treatment today. \par ___________________________________________________________\par \par

## 2020-05-04 NOTE — DISEASE MANAGEMENT
[Clinical] : TNM Stage: c [I] : I [FreeTextEntry4] : TBD/pending surgical staging [TTNM] : 1 [NTNM] : 0 [MTNM] : 0 [de-identified] : Completed 25 out of 25 fractions with a total dose of 5000 cGy

## 2020-05-04 NOTE — PHYSICAL EXAM
[de-identified] : Erythema of right breast noted.  Area of moist desquamation noted in the inframammary area of right breast [Normal] : oriented to person, place and time, the affect was normal, the mood was normal and not anxious

## 2020-05-04 NOTE — REVIEW OF SYSTEMS
[Fatigue: Grade 1 - Fatigue relieved by rest] : Fatigue: Grade 1 - Fatigue relieved by rest [Breast Pain: Grade 1 - Mild pain] : Breast Pain: Grade 1 - Mild pain [Headache: Grade 1 - Mild pain] : Headache: Grade 1 - Mild pain [Cough: Grade 0] : Cough: Grade 0 [Dyspnea: Grade 1 - Shortness of breath with moderate exertion] : Dyspnea: Grade 1 - Shortness of breath with moderate exertion [Hoarseness: Grade 0] : Hoarseness: Grade 0 [Hiccups: Grade 0] : Hiccups: Grade 0 [Hypoxia: Grade 0] : Hypoxia: Grade 0 [Pharyngeal Mucositis: Grade 0] : Pharyngeal Mucositis: Grade 0 [Pneumonitis: Grade 0] : Pneumonitis: Grade 0 [Voice Alteration: Grade 0] : Voice Alteration: Grade 0 [Alopecia: Grade 0] : Alopecia: Grade 0 [Pruritus: Grade 1 - Mild or localized; topical intervention indicated] : Pruritus: Grade 1 - Mild or localized; topical intervention indicated [Skin Atrophy: Grade 0] : Skin Atrophy: Grade 0 [Skin Hyperpigmentation: Grade 1 - Hyperpigmentation covering <10% BSA; no psychosocial impact] : Skin Hyperpigmentation: Grade 1 - Hyperpigmentation covering <10% BSA; no psychosocial impact [Dermatitis Radiation: Grade 1 - Faint erythema or dry desquamation] : Dermatitis Radiation: Grade 1 - Faint erythema or dry desquamation [Skin Induration: Grade 0] : Skin Induration: Grade 0 [FreeTextEntry7] : slight soreness right breast [FreeTextEntry2] : hydrocortisone cream

## 2020-05-04 NOTE — PHYSICAL EXAM
[Symmetric] : breasts are symmetric [Breast Abnormal Lactation (Galactorrhea)] : no nipple discharge [No UE Edema] : there is no upper extremity edema [Normal] : oriented to person, place and time, the affect was normal, the mood was normal and not anxious [de-identified] : Brisk erythema of breast with small area of moist desquamation in the inframammary area.

## 2020-05-04 NOTE — HISTORY OF PRESENT ILLNESS
[FreeTextEntry1] : 5/4/20- Pt here for a skin check after one week treatment break . Pt has been taking 500 mg - 1000 mg of Tylenol PRN pain with effect per pt. She c/o pain "5/10" currently with Tylenol extra strength. Skin redness and irritation on her right breast is slightly improved since last week, no worse. Moist desquamation under her breast present. Pt is doing Xeroform gauze dressings at home BID. Pt expressed that she wanted more of a treatment break. She will be reassessed next Monday to see if we can resume her radiation therapy treatments. Encouraged Xeroform dressings BID, pt verbalized an understanding. \par  \par _________________________________________________________________\par 5/1/20- Pt called to inform us that her skin redness/ irritation is worsening. Advised her to come in for an evaluation. Erythema present, slight warmth in her breast (right side), sloughing skin under her right breast. Pt has been using calendula on her whole breast BID and Xeroform, gauze under her breast (without Aquaphor due to her feeling she had a reaction to the Aquaphor in the past before this treatment). Pt had blistering under her skin she stated, which are open now. She states she has tightness and soreness and decreased ROM in her right arm and shoulder.\par __________________________________________________________________________________________________\par  Ms. Cordoba is a 74 year old woman with a history of chemotherapy for colonic ectasias with bleeding, lung cancer (pT2a 3.8 cm SCC, poorly differentiated) s/p RLL lobectomy in May 2019 now with an invasive lobular cancer of the right breast s/p biopsy pending surgical staging.\par 12/13/19- Routine mammogram- ordered by Primary MD- Lorraine Mir- noted to have suspicious lesion in right breast. Upper outer quadrant right breast, irregular nodule measures 0.7cm. BI-RADS- 0 - incomplete need additional imaging \par \par 12/26/19- Bilateral u/s revealed a 6mm hypoechoic lesion in the area of concern 10:00 in the right  6mm from the nipple\par \par 1/10/20- u/s guided core bx was performed- invasive lobular carcinoma, ER/SD +, HER-2 negative\par Right breast 10:00 6cm- invasive lobular carcinoma, classic type, grade 6/9, measures up to 6mm in greatest length in a single core\par \par 1/16/20 Dr. Woodard discussed with pt the options of breast conserving vs total mastectomy with or without reconstruction, and if reconstruction then implants vs tissue transfer. If margins were positive for partial mastectomy additional surgery would be required. Dr. Woodard  offered the patient a sentinel node biopsy procedure. If positive, for mastectomy, axillary dissection will occur. If positive for lumpectomy procedures no axillary dissection,since pt will get axillary radiation. \par \par 1/20/20- MRI- Abnormal suspicious enhancement  around the biopsy clip in the right breast 10:00 mid third measuring about 9mm.  Linear enhancement extending posteriorly along the inferior aspect of the nodule measuring 1.2cm. No lymphadenopathy. \par \par HX- lung cancer and RLL lobectomy 5/2019 by Dr. Hdz- Pt c/o pain '8/10" in right chest wall area when touching the area- taking low dose Naltrexone once a day. Pt stated Dr. Asif suggested chemotherapy- pt didn't want to get it. \par ___________________________________________________________\par \par 4/27/20 Pt presents today with c/o tenderness in her right breast. Right breast with redness of the skin with some slight open area under the breast, no blistering present. Pt using calendula BID currently. Pt expressed a concern about starting her boost treatment today. \par ___________________________________________________________\par \par

## 2020-05-04 NOTE — REVIEW OF SYSTEMS
[Fatigue: Grade 1 - Fatigue relieved by rest] : Fatigue: Grade 1 - Fatigue relieved by rest [Breast Pain: Grade 1 - Mild pain] : Breast Pain: Grade 1 - Mild pain [Headache: Grade 1 - Mild pain] : Headache: Grade 1 - Mild pain [Dyspnea: Grade 1 - Shortness of breath with moderate exertion] : Dyspnea: Grade 1 - Shortness of breath with moderate exertion [Cough: Grade 0] : Cough: Grade 0 [Hiccups: Grade 0] : Hiccups: Grade 0 [Pharyngeal Mucositis: Grade 0] : Pharyngeal Mucositis: Grade 0 [Hypoxia: Grade 0] : Hypoxia: Grade 0 [Hoarseness: Grade 0] : Hoarseness: Grade 0 [Voice Alteration: Grade 0] : Voice Alteration: Grade 0 [Pneumonitis: Grade 0] : Pneumonitis: Grade 0 [Alopecia: Grade 0] : Alopecia: Grade 0 [Pruritus: Grade 1 - Mild or localized; topical intervention indicated] : Pruritus: Grade 1 - Mild or localized; topical intervention indicated [Skin Atrophy: Grade 0] : Skin Atrophy: Grade 0 [Skin Induration: Grade 0] : Skin Induration: Grade 0 [Skin Hyperpigmentation: Grade 2 - Hyperpigmentation covering >10% BSA; associated psychosocial impact] : Skin Hyperpigmentation: Grade 2 - Hyperpigmentation covering >10% BSA; associated psychosocial impact [Dermatitis Radiation: Grade 2 - Moderate to brisk erythema; patchy moist desquamation, mostly confined to skin folds and creases; moderate edema] : Dermatitis Radiation: Grade 2 - Moderate to brisk erythema; patchy moist desquamation, mostly confined to skin folds and creases; moderate edema [FreeTextEntry7] : slight soreness right breast [FreeTextEntry2] : hydrocortisone cream

## 2020-05-11 VITALS
WEIGHT: 188 LBS | BODY MASS INDEX: 34.6 KG/M2 | HEIGHT: 62 IN | HEART RATE: 76 BPM | TEMPERATURE: 97.6 F | SYSTOLIC BLOOD PRESSURE: 150 MMHG | DIASTOLIC BLOOD PRESSURE: 80 MMHG | OXYGEN SATURATION: 99 % | RESPIRATION RATE: 16 BRPM

## 2020-05-11 NOTE — HISTORY OF PRESENT ILLNESS
[FreeTextEntry1] : 5/11/20- Pt here for a skin check. Pt has been taking 500 mg - 1000 mg of Tylenol PRN pain with effect per pt. She c/o pain "5/10" currently with Tylenol extra strength. Skin redness and irritation on her right breast has improved since last week, no worse. Moist desquamation under her breast present. Pt is doing Xeroform gauze dressings at home BID. Pt has decided that she does not want to continue with the boost treatment. She will continue with the dressings for one week and then will continue using aquaphor to the breast until her f/u appointment in 1 month. Pt encouraged to call if no improvement and will make an appointment for 1 month. Pt verbalized understanding ______________________________________________________________________________\par  \par _________________________________________________________________\par 5/1/20- Pt called to inform us that her skin redness/ irritation is worsening. Advised her to come in for an evaluation. Erythema present, slight warmth in her breast (right side), sloughing skin under her right breast. Pt has been using calendula on her whole breast BID and Xeroform, gauze under her breast (without Aquaphor due to her feeling she had a reaction to the Aquaphor in the past before this treatment). Pt had blistering under her skin she stated, which are open now. She states she has tightness and soreness and decreased ROM in her right arm and shoulder.\par __________________________________________________________________________________________________\par  Ms. Cordoba is a 74 year old woman with a history of chemotherapy for colonic ectasias with bleeding, lung cancer (pT2a 3.8 cm SCC, poorly differentiated) s/p RLL lobectomy in May 2019 now with an invasive lobular cancer of the right breast s/p biopsy pending surgical staging.\par 12/13/19- Routine mammogram- ordered by Primary MD- Lorraine Mir- noted to have suspicious lesion in right breast. Upper outer quadrant right breast, irregular nodule measures 0.7cm. BI-RADS- 0 - incomplete need additional imaging \par \par 12/26/19- Bilateral u/s revealed a 6mm hypoechoic lesion in the area of concern 10:00 in the right  6mm from the nipple\par \par 1/10/20- u/s guided core bx was performed- invasive lobular carcinoma, ER/GA +, HER-2 negative\par Right breast 10:00 6cm- invasive lobular carcinoma, classic type, grade 6/9, measures up to 6mm in greatest length in a single core\par \par 1/16/20 Dr. Woodard discussed with pt the options of breast conserving vs total mastectomy with or without reconstruction, and if reconstruction then implants vs tissue transfer. If margins were positive for partial mastectomy additional surgery would be required. Dr. Woodard  offered the patient a sentinel node biopsy procedure. If positive, for mastectomy, axillary dissection will occur. If positive for lumpectomy procedures no axillary dissection,since pt will get axillary radiation. \par \par 1/20/20- MRI- Abnormal suspicious enhancement  around the biopsy clip in the right breast 10:00 mid third measuring about 9mm.  Linear enhancement extending posteriorly along the inferior aspect of the nodule measuring 1.2cm. No lymphadenopathy. \par \par HX- lung cancer and RLL lobectomy 5/2019 by Dr. Hdz- Pt c/o pain '8/10" in right chest wall area when touching the area- taking low dose Naltrexone once a day. Pt stated Dr. Asif suggested chemotherapy- pt didn't want to get it. \par ___________________________________________________________\par \par 4/27/20 Pt presents today with c/o tenderness in her right breast. Right breast with redness of the skin with some slight open area under the breast, no blistering present. Pt using calendula BID currently. Pt expressed a concern about starting her boost treatment today. \par ___________________________________________________________\par \par

## 2020-05-11 NOTE — REVIEW OF SYSTEMS
[Fatigue: Grade 1 - Fatigue relieved by rest] : Fatigue: Grade 1 - Fatigue relieved by rest [Breast Pain: Grade 1 - Mild pain] : Breast Pain: Grade 1 - Mild pain [Headache: Grade 1 - Mild pain] : Headache: Grade 1 - Mild pain [Cough: Grade 0] : Cough: Grade 0 [Dyspnea: Grade 1 - Shortness of breath with moderate exertion] : Dyspnea: Grade 1 - Shortness of breath with moderate exertion [Hiccups: Grade 0] : Hiccups: Grade 0 [Hoarseness: Grade 0] : Hoarseness: Grade 0 [Pharyngeal Mucositis: Grade 0] : Pharyngeal Mucositis: Grade 0 [Pneumonitis: Grade 0] : Pneumonitis: Grade 0 [Hypoxia: Grade 0] : Hypoxia: Grade 0 [Voice Alteration: Grade 0] : Voice Alteration: Grade 0 [Alopecia: Grade 0] : Alopecia: Grade 0 [Pruritus: Grade 0] : Pruritus: Grade 0 [Skin Hyperpigmentation: Grade 1 - Hyperpigmentation covering <10% BSA; no psychosocial impact] : Skin Hyperpigmentation: Grade 1 - Hyperpigmentation covering <10% BSA; no psychosocial impact [Skin Atrophy: Grade 0] : Skin Atrophy: Grade 0 [Skin Induration: Grade 0] : Skin Induration: Grade 0 [Dermatitis Radiation: Grade 2 - Moderate to brisk erythema; patchy moist desquamation, mostly confined to skin folds and creases; moderate edema] : Dermatitis Radiation: Grade 2 - Moderate to brisk erythema; patchy moist desquamation, mostly confined to skin folds and creases; moderate edema [FreeTextEntry2] : ELAINE [FreeTextEntry7] : slight soreness right breast [FreeTextEntry6] : xerofoam dressing

## 2020-05-11 NOTE — PHYSICAL EXAM
[Symmetric] : breasts are symmetric [No UE Edema] : there is no upper extremity edema [Breast Abnormal Lactation (Galactorrhea)] : no nipple discharge [Breast Palpation Mass] : no palpable masses [Normal] : oriented to person, place and time, the affect was normal, the mood was normal and not anxious [de-identified] : The erythema of the right breast has mostly resolved.  Small area of moist desquamation noted at the inframammary fold on right side with no super imposed infection

## 2020-05-11 NOTE — DISEASE MANAGEMENT
[Clinical] : TNM Stage: c [I] : I [FreeTextEntry4] : TBD/pending surgical staging [TTNM] : 1 [MTNM] : 0 [NTNM] : 0 [de-identified] : Completed 25 out of 25 fractions with a total dose of 5000 cGy

## 2020-05-11 NOTE — VITALS
[Least Pain Intensity: 5/10] : 5/10 [Maximal Pain Intensity: 5/10] : 5/10 [Pain Duration: ___] : Pain duration: [unfilled] [Pain Description/Quality: ___] : Pain description/quality: [unfilled] [Pain Interferes with ADLs] : Pain interferes with activities of daily living. [Pain Location: ___] : Pain Location: [unfilled] [80: Normal activity with effort; some signs or symptoms of disease.] : 80: Normal activity with effort; some signs or symptoms of disease.  [OTC] : OTC [ECOG Performance Status: 1 - Restricted in physically strenuous activity but ambulatory and able to carry out work of a light or sedentary nature] : Performance Status: 1 - Restricted in physically strenuous activity but ambulatory and able to carry out work of a light or sedentary nature, e.g., light house work, office work [Date: ____________] : Patient's last distress assessment performed on [unfilled]. [80: Active, but tires more quickly] : 80: Active, but tires more quickly [0 - No Distress] : Distress Level: 0

## 2020-06-08 ENCOUNTER — APPOINTMENT (OUTPATIENT)
Dept: RADIATION ONCOLOGY | Facility: CLINIC | Age: 75
End: 2020-06-08
Payer: MEDICARE

## 2020-06-08 VITALS
HEART RATE: 80 BPM | SYSTOLIC BLOOD PRESSURE: 138 MMHG | OXYGEN SATURATION: 99 % | TEMPERATURE: 98 F | DIASTOLIC BLOOD PRESSURE: 74 MMHG | HEIGHT: 62 IN | RESPIRATION RATE: 12 BRPM | WEIGHT: 180 LBS | BODY MASS INDEX: 33.13 KG/M2

## 2020-06-08 PROCEDURE — 99024 POSTOP FOLLOW-UP VISIT: CPT

## 2020-06-08 NOTE — DISEASE MANAGEMENT
[Clinical] : TNM Stage: c [I] : I [FreeTextEntry4] : TBD/pending surgical staging [TTNM] : 1 [NTNM] : 0 [MTNM] : 0 [de-identified] : Completed 25 out of 25 fractions with a total dose of 5000 cGy on 4/24/20

## 2020-06-08 NOTE — PHYSICAL EXAM
[Breast Appearance] : normal in appearance [Symmetric] : breasts are symmetric [Breast Palpation Mass] : no palpable masses [Breast Abnormal Lactation (Galactorrhea)] : no nipple discharge [No UE Edema] : there is no upper extremity edema [Normal] : oriented to person, place and time, the affect was normal, the mood was normal and not anxious [de-identified] : Well-healed lumpectomy.  No underlying induration noted.  No hyperpigmentation noted.  Desquamation has completely subsided.  No residual erythema seen.

## 2020-06-08 NOTE — HISTORY OF PRESENT ILLNESS
[FreeTextEntry1] :  Ms. Cordoba is a 74 year old woman with a history of chemotherapy for colonic ectasias with bleeding, lung cancer (pT2a 3.8 cm SCC, poorly differentiated) s/p RLL lobectomy in May 2019 now with an invasive lobular cancer of the right breast s/p biopsy pending surgical staging.\par 12/13/19- Routine mammogram- ordered by Primary MD- Lorraine Mir- noted to have suspicious lesion in right breast. Upper outer quadrant right breast, irregular nodule measures 0.7cm. BI-RADS- 0 - incomplete need additional imaging \par \par 12/26/19- Bilateral u/s revealed a 6mm hypoechoic lesion in the area of concern 10:00 in the right  6mm from the nipple\par \par 1/10/20- u/s guided core bx was performed- invasive lobular carcinoma, ER/MO +, HER-2 negative\par Right breast 10:00 6cm- invasive lobular carcinoma, classic type, grade 6/9, measures up to 6mm in greatest length in a single core\par \par 1/16/20 Dr. Woodard discussed with pt the options of breast conserving vs total mastectomy with or without reconstruction, and if reconstruction then implants vs tissue transfer. If margins were positive for partial mastectomy additional surgery would be required. Dr. Woodard  offered the patient a sentinel node biopsy procedure. If positive, for mastectomy, axillary dissection will occur. If positive for lumpectomy procedures no axillary dissection,since pt will get axillary radiation. \par \par 1/20/20- MRI- Abnormal suspicious enhancement  around the biopsy clip in the right breast 10:00 mid third measuring about 9mm.  Linear enhancement extending posteriorly along the inferior aspect of the nodule measuring 1.2cm. No lymphadenopathy. \par \par HX- lung cancer and RLL lobectomy 5/2019 by Dr. Hdz- Pt c/o pain '8/10" in right chest wall area when touching the area- taking low dose Naltrexone once a day. Pt stated Dr. Asif suggested chemotherapy- pt didn't want to get it. \par ___________________________________________________________\par 4/27/20 Pt presents today with c/o tenderness in her right breast. Right breast with redness of the skin with some slight open area under the breast, no blistering present. Pt using calendula BID currently. Pt expressed a concern about starting her boost treatment today. \par ___________________________________________________________\par 5/1/20- Pt called to inform us that her skin redness/ irritation is worsening. Advised her to come in for an evaluation. Erythema present, slight warmth in her breast (right side), sloughing skin under her right breast. Pt has been using calendula on her whole breast BID and Xeroform, gauze under her breast (without Aquaphor due to her feeling she had a reaction to the Aquaphor in the past before this treatment). Pt had blistering under her skin she stated, which are open now. She states she has tightness and soreness and decreased ROM in her right arm and shoulder.\par ____________________________________________________________\par 5/11/20- Pt here for a skin check. Pt has been taking 500 mg - 1000 mg of Tylenol PRN pain with effect per pt. She c/o pain "5/10" currently with Tylenol extra strength. Skin redness and irritation on her right breast has improved since last week, no worse. Moist desquamation under her breast present. Pt is doing Xeroform gauze dressings at home BID. Pt has decided that she does not want to continue with the boost treatment. She will continue with the dressings for one week and then will continue using aquaphor to the breast until her f/u appointment in 1 month. Pt encouraged to call if no improvement and will make an appointment for 1 month. Pt verbalized understanding\par ____________________________________________________\par \par 6/8/20- Follow up\par \par Pt here for followup visit today. Slight pink/hyperpigmentation under her right breast, pt is using Aquaphor daily still. C/O discomfort/pain under the breast "4/10" when she feels pain. She also describes her right breast to be tight underneath to the right back . \par \par She completed 25 out of 25 fractions with a total dose of 5000 cGy to the right breast on 4/24/20\par \par Denies limitation of shoulder movements.  No history of arm swelling.  No history of palpable lump in her breast.

## 2020-06-08 NOTE — VITALS
[Maximal Pain Intensity: 4/10] : 4/10 [Least Pain Intensity: 4/10] : 4/10 [Pain Description/Quality: ___] : Pain description/quality: [unfilled] [Pain Duration: ___] : Pain duration: [unfilled] [Pain Location: ___] : Pain Location: [unfilled] [Pain Interferes with ADLs] : Pain interferes with activities of daily living. [80: Normal activity with effort; some signs or symptoms of disease.] : 80: Normal activity with effort; some signs or symptoms of disease.  [80: Active, but tires more quickly] : 80: Active, but tires more quickly [ECOG Performance Status: 2 - Ambulatory and capable of all self care but unable to carry out any work activities] : Performance Status: 2 - Ambulatory and capable of all self care but unable to carry out any work activities. Up and about more than 50% of waking hours

## 2020-06-08 NOTE — REVIEW OF SYSTEMS
[Cough: Grade 0] : Cough: Grade 0 [Dyspnea: Grade 1 - Shortness of breath with moderate exertion] : Dyspnea: Grade 1 - Shortness of breath with moderate exertion [Hiccups: Grade 0] : Hiccups: Grade 0 [Hoarseness: Grade 0] : Hoarseness: Grade 0 [Hypoxia: Grade 0] : Hypoxia: Grade 0 [Pneumonitis: Grade 0] : Pneumonitis: Grade 0 [Pharyngeal Mucositis: Grade 0] : Pharyngeal Mucositis: Grade 0 [Voice Alteration: Grade 0] : Voice Alteration: Grade 0 [Alopecia: Grade 0] : Alopecia: Grade 0 [Pruritus: Grade 0] : Pruritus: Grade 0 [Skin Atrophy: Grade 0] : Skin Atrophy: Grade 0 [Skin Hyperpigmentation: Grade 1 - Hyperpigmentation covering <10% BSA; no psychosocial impact] : Skin Hyperpigmentation: Grade 1 - Hyperpigmentation covering <10% BSA; no psychosocial impact [Dermatitis Radiation: Grade 1 - Faint erythema or dry desquamation] : Dermatitis Radiation: Grade 1 - Faint erythema or dry desquamation [Skin Induration: Grade 0] : Skin Induration: Grade 0

## 2020-10-16 ENCOUNTER — APPOINTMENT (OUTPATIENT)
Dept: THORACIC SURGERY | Facility: CLINIC | Age: 75
End: 2020-10-16
Payer: MEDICARE

## 2020-10-16 PROCEDURE — 99215 OFFICE O/P EST HI 40 MIN: CPT

## 2020-12-07 ENCOUNTER — APPOINTMENT (OUTPATIENT)
Dept: RADIATION ONCOLOGY | Facility: CLINIC | Age: 75
End: 2020-12-07
Payer: MEDICARE

## 2020-12-07 VITALS
HEART RATE: 78 BPM | DIASTOLIC BLOOD PRESSURE: 92 MMHG | HEIGHT: 62 IN | TEMPERATURE: 98 F | BODY MASS INDEX: 33.68 KG/M2 | RESPIRATION RATE: 12 BRPM | OXYGEN SATURATION: 99 % | WEIGHT: 183 LBS | SYSTOLIC BLOOD PRESSURE: 141 MMHG

## 2020-12-07 PROCEDURE — 99072 ADDL SUPL MATRL&STAF TM PHE: CPT

## 2020-12-07 PROCEDURE — 99213 OFFICE O/P EST LOW 20 MIN: CPT

## 2020-12-07 RX ORDER — NALTREXONE HYDROCHLORIDE 50 MG/1
TABLET, FILM COATED ORAL DAILY
Refills: 0 | Status: DISCONTINUED | COMMUNITY
End: 2020-12-07

## 2020-12-07 NOTE — VITALS
[Maximal Pain Intensity: 5/10] : 5/10 [Least Pain Intensity: 5/10] : 5/10 [Pain Description/Quality: ___] : Pain description/quality: [unfilled] [Pain Duration: ___] : Pain duration: [unfilled] [Pain Location: ___] : Pain Location: [unfilled] [Pain Interferes with ADLs] : Pain interferes with activities of daily living. [NoTreatment Scheduled] : no treatment scheduled [80: Normal activity with effort; some signs or symptoms of disease.] : 80: Normal activity with effort; some signs or symptoms of disease.  [80: Active, but tires more quickly] : 80: Active, but tires more quickly [ECOG Performance Status: 1 - Restricted in physically strenuous activity but ambulatory and able to carry out work of a light or sedentary nature] : Performance Status: 1 - Restricted in physically strenuous activity but ambulatory and able to carry out work of a light or sedentary nature, e.g., light house work, office work

## 2020-12-07 NOTE — PHYSICAL EXAM
[Breast Appearance] : normal in appearance [Breast Palpation Mass] : no palpable masses [Breast Abnormal Lactation (Galactorrhea)] : no nipple discharge [No UE Edema] : there is no upper extremity edema [Normal] : oriented to person, place and time, the affect was normal, the mood was normal and not anxious [de-identified] : Minimal asymmetry with the right breast smaller than left.  No telangiectasia noted no lumps palpable in the right breast no lymph nodes palpable in right axilla.  No lumps palpable in left breast.

## 2020-12-07 NOTE — REVIEW OF SYSTEMS
[Fatigue: Grade 2 - Fatigue not relieved by rest; limiting instrumental ADL] : Fatigue: Grade 2 - Fatigue not relieved by rest; limiting instrumental ADL [Breast Pain: Grade 1 - Mild pain] : Breast Pain: Grade 1 - Mild pain [Cough: Grade 0] : Cough: Grade 0 [Dyspnea: Grade 1 - Shortness of breath with moderate exertion] : Dyspnea: Grade 1 - Shortness of breath with moderate exertion [Hiccups: Grade 0] : Hiccups: Grade 0 [Hoarseness: Grade 0] : Hoarseness: Grade 0 [Hypoxia: Grade 0] : Hypoxia: Grade 0 [Pharyngeal Mucositis: Grade 0] : Pharyngeal Mucositis: Grade 0 [Pneumonitis: Grade 0] : Pneumonitis: Grade 0 [Voice Alteration: Grade 0] : Voice Alteration: Grade 0 [Alopecia: Grade 0] : Alopecia: Grade 0 [Pruritus: Grade 0] : Pruritus: Grade 0 [Skin Atrophy: Grade 0] : Skin Atrophy: Grade 0 [Skin Hyperpigmentation: Grade 1 - Hyperpigmentation covering <10% BSA; no psychosocial impact] : Skin Hyperpigmentation: Grade 1 - Hyperpigmentation covering <10% BSA; no psychosocial impact [Skin Induration: Grade 0] : Skin Induration: Grade 0 [Dermatitis Radiation: Grade 1 - Faint erythema or dry desquamation] : Dermatitis Radiation: Grade 1 - Faint erythema or dry desquamation

## 2020-12-07 NOTE — DISEASE MANAGEMENT
[Clinical] : TNM Stage: c [I] : I [FreeTextEntry4] : TBD/pending surgical staging [TTNM] : 1 [NTNM] : 0 [MTNM] : 0 [de-identified] : Completed 25 out of 25 fractions with a total dose of 5000 cGy on 4/24/20

## 2020-12-07 NOTE — HISTORY OF PRESENT ILLNESS
[FreeTextEntry1] : Ms. Cordoba is a 74 year old woman with lung cancer (pT2a 3.8 cm SCC, poorly differentiated) s/p RLL lobectomy in May 2019 now with a stage Ib, [id number] invasive lobular carcinoma intermediate grade of the right breast. She completed 25 out of 25 fractions with a total dose of 5000 cGy to the right breast on 4/24/20. The patient was here last on 6/8/2020, at that time her acute reactions had completely subsided, and was starting an aromatase inhibitor. \par \par On 8/18/2020 she had a PET scan which revealed no evidence of focal hypermetabolic lesion. Mild uptake right upper lobe corresponding to a small, new focus increased density noted on recent outside chest CT. \par \par On 12/2/2020 she had a bilateral breast ultrasound which revealed new post surgical changes upper-outer right breast. New finding in the left breast 12:00 axis which could represent fat necrosis and is new. Correlation with mammography is recommended.\par \par She is here for a follow up. She started Anastrozole and has been taking them daily. She is seeing Dr. Asif soon she stated. She sates no issues related to the medicine. She denies a cough, but does state she has ELAINE, and audible wheezing. She states that her right breast has occasional pain "5/10" when she touches it, and it is always tender. She is scheduled to have a mammogram 12/11/2020.\par

## 2021-01-12 ENCOUNTER — APPOINTMENT (OUTPATIENT)
Dept: THORACIC SURGERY | Facility: CLINIC | Age: 76
End: 2021-01-12
Payer: MEDICARE

## 2021-01-12 PROCEDURE — 99072 ADDL SUPL MATRL&STAF TM PHE: CPT

## 2021-01-12 PROCEDURE — 99213 OFFICE O/P EST LOW 20 MIN: CPT

## 2021-04-07 NOTE — REASON FOR VISIT
[Consideration of Curative Therapy] : consideration of curative therapy for [Breast Cancer] : breast cancer [Spouse] : spouse Purse String (Intermediate) Text: Given the location of the defect and the characteristics of the surrounding skin a purse string intermediate closure was deemed most appropriate.  Undermining was performed circumferentially around the surgical defect.  A purse string suture was then placed and tightened.

## 2021-07-27 NOTE — DISEASE MANAGEMENT
Duplicate [Clinical] : TNM Stage: c [I] : I [FreeTextEntry4] : TBD/pending surgical staging [TTNM] : 1 [NTNM] : 0 [MTNM] : 0 [de-identified] : Completed 25 out of 25 fractions with a total dose of 5000 cGy

## 2022-03-10 ENCOUNTER — APPOINTMENT (OUTPATIENT)
Dept: RADIATION ONCOLOGY | Facility: CLINIC | Age: 77
End: 2022-03-10
Payer: MEDICARE

## 2022-03-31 ENCOUNTER — APPOINTMENT (OUTPATIENT)
Dept: RADIATION ONCOLOGY | Facility: CLINIC | Age: 77
End: 2022-03-31
Payer: MEDICARE

## 2022-03-31 VITALS
HEART RATE: 76 BPM | OXYGEN SATURATION: 96 % | BODY MASS INDEX: 32.76 KG/M2 | HEIGHT: 62 IN | WEIGHT: 178 LBS | DIASTOLIC BLOOD PRESSURE: 88 MMHG | TEMPERATURE: 98 F | SYSTOLIC BLOOD PRESSURE: 135 MMHG | RESPIRATION RATE: 12 BRPM

## 2022-03-31 PROCEDURE — 99213 OFFICE O/P EST LOW 20 MIN: CPT

## 2022-03-31 NOTE — PHYSICAL EXAM
[Breast Appearance] : normal in appearance [Breast Palpation Mass] : no palpable masses [Breast Abnormal Lactation (Galactorrhea)] : no nipple discharge [No UE Edema] : there is no upper extremity edema [Normal] : oriented to person, place and time, the affect was normal, the mood was normal and not anxious [de-identified] : Well-healed lumpectomy scar with no underlying induration.  No lymph nodes palpable in the right axilla.  Excellent cosmesis seen

## 2023-01-30 ENCOUNTER — NON-APPOINTMENT (OUTPATIENT)
Age: 78
End: 2023-01-30

## 2023-01-30 ENCOUNTER — APPOINTMENT (OUTPATIENT)
Dept: BREAST CENTER | Facility: CLINIC | Age: 78
End: 2023-01-30
Payer: MEDICARE

## 2023-01-30 VITALS
HEIGHT: 62 IN | SYSTOLIC BLOOD PRESSURE: 143 MMHG | OXYGEN SATURATION: 97 % | HEART RATE: 71 BPM | BODY MASS INDEX: 31.28 KG/M2 | WEIGHT: 170 LBS | DIASTOLIC BLOOD PRESSURE: 82 MMHG

## 2023-01-30 DIAGNOSIS — R92.0 MAMMOGRAPHIC MICROCALCIFICATION FOUND ON DIAGNOSTIC IMAGING OF BREAST: ICD-10-CM

## 2023-01-30 DIAGNOSIS — Z12.31 ENCOUNTER FOR SCREENING MAMMOGRAM FOR MALIGNANT NEOPLASM OF BREAST: ICD-10-CM

## 2023-01-30 DIAGNOSIS — R92.2 INCONCLUSIVE MAMMOGRAM: ICD-10-CM

## 2023-01-30 PROCEDURE — 99203 OFFICE O/P NEW LOW 30 MIN: CPT

## 2023-01-30 NOTE — PHYSICAL EXAM
[No Supraclavicular Adenopathy] : no supraclavicular adenopathy [No Cervical Adenopathy] : no cervical adenopathy [Clear to Auscultation Bilat] : clear to auscultation bilaterally [Examined in the supine and seated position] : examined in the supine and seated position [Symmetrical] : symmetrical [No dominant masses] : no dominant masses in right breast  [No dominant masses] : no dominant masses left breast [No Nipple Retraction] : no left nipple retraction [No Nipple Discharge] : no left nipple discharge [No Axillary Lymphadenopathy] : no left axillary lymphadenopathy [No Rashes] : no rashes [de-identified] : In the central chest just below the sternal notch is a 12 x 6 mm red area with a prior biopsy was performed.

## 2023-01-30 NOTE — REVIEW OF SYSTEMS
[Negative] : Heme/Lymph [Chills] : chills [Feeling Poorly] : feeling poorly [Feeling Tired] : feeling tired [Dry Eyes] : dryness of the eyes [Shortness Of Breath] : shortness of breath [Wheezing] : wheezing [Joint Stiffness] : joint stiffness [Limb Pain] : limb pain [Limb Swelling] : limb swelling [Skin Lesions] : skin lesion [Difficulty Walking] : difficulty walking [Sleep Disturbances] : sleep disturbances [Muscle Weakness] : muscle weakness [Feelings Of Weakness] : feelings of weakness

## 2023-01-30 NOTE — HISTORY OF PRESENT ILLNESS
[FreeTextEntry1] : 77-year-old postmenopausal woman with a personal history of breast and lung cancer presents after she had gone to her dermatologist for a several month history of a anterior chest and posterior left shoulder skin lesions.  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  Dermatologist was noted scraping freeze these areas off but the patient is very concerned about this as it was scheduled some 4 months in the future.  Patient also has a history of breast cancer and would like to follow-up with me for that past problem.  In 2020 patient was found to have a right breast cancer seen as an invasive lobular carcinoma which was treated with partial mastectomy and sentinel node biopsy followed by radiation and anastrozole.  The tumor is 7 mm and was ER/HI positive HER2 FISH negative with a Ki-67 of 25%.  One of the sentinel nodes was positive for microscopic disease, T1c, N1 MI, M0: Stage Ia.  Oncotype was reported at 17.  Patient has done well and last November had a mammogram which showed dystrophic calcifications of the right breast wide excision site, BI-RADS 3.  Patient currently denies any breast masses or bone pain.  In addition in 2019 patient had a right lobectomy for a early squamous cell carcinoma of the lung.

## 2023-05-09 ENCOUNTER — RESULT REVIEW (OUTPATIENT)
Age: 78
End: 2023-05-09

## 2023-05-22 ENCOUNTER — APPOINTMENT (OUTPATIENT)
Dept: BREAST CENTER | Facility: CLINIC | Age: 78
End: 2023-05-22
Payer: MEDICARE

## 2023-05-22 PROCEDURE — 99213 OFFICE O/P EST LOW 20 MIN: CPT

## 2023-05-22 NOTE — PHYSICAL EXAM
[No Supraclavicular Adenopathy] : no supraclavicular adenopathy [No Cervical Adenopathy] : no cervical adenopathy [Clear to Auscultation Bilat] : clear to auscultation bilaterally [Examined in the supine and seated position] : examined in the supine and seated position [Symmetrical] : symmetrical [No dominant masses] : no dominant masses in right breast  [No dominant masses] : no dominant masses left breast [No Nipple Retraction] : no left nipple retraction [No Nipple Discharge] : no right nipple discharge [No Axillary Lymphadenopathy] : no left axillary lymphadenopathy [No Rashes] : no rashes

## 2023-05-22 NOTE — REVIEW OF SYSTEMS
[Chills] : chills [Feeling Poorly] : feeling poorly [Feeling Tired] : feeling tired [Dry Eyes] : dryness of the eyes [Shortness Of Breath] : shortness of breath [Wheezing] : wheezing [Joint Stiffness] : joint stiffness [Limb Pain] : limb pain [Limb Swelling] : limb swelling [Skin Lesions] : skin lesion [Difficulty Walking] : difficulty walking [Sleep Disturbances] : sleep disturbances [Muscle Weakness] : muscle weakness [Feelings Of Weakness] : feelings of weakness [Negative] : Heme/Lymph

## 2023-05-22 NOTE — HISTORY OF PRESENT ILLNESS
[FreeTextEntry1] : 1/20 right partial mastectomy with sentinel node biopsy\par Classic invasive lobular carcinoma, ER/KY positive HER2 FISH negative, 7 mm, Ki-67 of 25%, 1/1 nodes\par Whole breast radiation, anastrozole\par \par Patient denies any breast masses or bone pain.  Recent right diagnostic mammogram showed benign stability.    Patient is taking and tolerating anastrozole.  Patient is doing well with history of some carcinomas being treated by dermatology.\par \par \par 77-year-old postmenopausal woman with a personal history of breast and lung cancer presents after she had gone to her dermatologist for a several month history of a anterior chest and posterior left shoulder skin lesions.  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  Dermatologist was noted scraping freeze these areas off but the patient is very concerned about this as it was scheduled some 4 months in the future.  Patient also has a history of breast cancer and would like to follow-up with me for that past problem.  In 2020 patient was found to have a right breast cancer seen as an invasive lobular carcinoma which was treated with partial mastectomy and sentinel node biopsy followed by radiation and anastrozole.  The tumor is 7 mm and was ER/KY positive HER2 FISH negative with a Ki-67 of 25%.  One of the sentinel nodes was positive for microscopic disease, T1c, N1 MI, M0: Stage Ia.  Oncotype was reported at 17.  Patient has done well and last November had a mammogram which showed dystrophic calcifications of the right breast wide excision site, BI-RADS 3.  Patient currently denies any breast masses or bone pain.  In addition in 2019 patient had a right lobectomy for a early squamous cell carcinoma of the lung.

## 2023-05-22 NOTE — PAST MEDICAL HISTORY
[Menarche Age ____] : age at menarche was [unfilled] [History of Hormone Replacement Treatment] : has no history of hormone replacement treatment [Approximately ___] : the LMP was approximately [unfilled] [Total Preg ___] : G[unfilled] [Live Births ___] : P[unfilled]  [Age At Live Birth ___] : Age at live birth: [unfilled]

## 2023-06-02 ENCOUNTER — APPOINTMENT (OUTPATIENT)
Dept: INTERNAL MEDICINE | Facility: CLINIC | Age: 78
End: 2023-06-02
Payer: MEDICARE

## 2023-06-02 VITALS
HEART RATE: 68 BPM | TEMPERATURE: 97.9 F | DIASTOLIC BLOOD PRESSURE: 80 MMHG | SYSTOLIC BLOOD PRESSURE: 142 MMHG | BODY MASS INDEX: 32.76 KG/M2 | OXYGEN SATURATION: 97 % | HEIGHT: 62 IN | WEIGHT: 178 LBS

## 2023-06-02 VITALS — SYSTOLIC BLOOD PRESSURE: 130 MMHG | DIASTOLIC BLOOD PRESSURE: 60 MMHG

## 2023-06-02 DIAGNOSIS — Z76.89 PERSONS ENCOUNTERING HEALTH SERVICES IN OTHER SPECIFIED CIRCUMSTANCES: ICD-10-CM

## 2023-06-02 DIAGNOSIS — M48.061 SPINAL STENOSIS, LUMBAR REGION WITHOUT NEUROGENIC CLAUDICATION: ICD-10-CM

## 2023-06-02 DIAGNOSIS — Z90.11 ACQUIRED ABSENCE OF RIGHT BREAST AND NIPPLE: ICD-10-CM

## 2023-06-02 DIAGNOSIS — M16.11 UNILATERAL PRIMARY OSTEOARTHRITIS, RIGHT HIP: ICD-10-CM

## 2023-06-02 PROCEDURE — 99203 OFFICE O/P NEW LOW 30 MIN: CPT

## 2023-06-02 RX ORDER — SPIRONOLACTONE 25 MG/1
25 TABLET ORAL
Refills: 0 | Status: DISCONTINUED | COMMUNITY
End: 2023-06-02

## 2023-07-27 ENCOUNTER — APPOINTMENT (OUTPATIENT)
Dept: GERIATRICS | Facility: CLINIC | Age: 78
End: 2023-07-27

## 2023-08-14 ENCOUNTER — LABORATORY RESULT (OUTPATIENT)
Age: 78
End: 2023-08-14

## 2023-08-14 ENCOUNTER — APPOINTMENT (OUTPATIENT)
Dept: INTERNAL MEDICINE | Facility: CLINIC | Age: 78
End: 2023-08-14
Payer: MEDICARE

## 2023-08-14 PROCEDURE — P9615: CPT

## 2023-08-15 LAB
APPEARANCE: CLEAR
BILIRUBIN URINE: NEGATIVE
BLOOD URINE: ABNORMAL
COLOR: YELLOW
GLUCOSE QUALITATIVE U: NEGATIVE MG/DL
KETONES URINE: NEGATIVE MG/DL
LEUKOCYTE ESTERASE URINE: ABNORMAL
NITRITE URINE: NEGATIVE
PH URINE: 7
PROTEIN URINE: NEGATIVE MG/DL
SPECIFIC GRAVITY URINE: 1.01
UROBILINOGEN URINE: 0.2 MG/DL

## 2023-08-21 RX ORDER — NITROFURANTOIN (MONOHYDRATE/MACROCRYSTALS) 25; 75 MG/1; MG/1
100 CAPSULE ORAL
Qty: 10 | Refills: 0 | Status: DISCONTINUED | COMMUNITY
Start: 2023-08-17 | End: 2023-08-21

## 2023-09-01 ENCOUNTER — APPOINTMENT (OUTPATIENT)
Dept: INTERNAL MEDICINE | Facility: CLINIC | Age: 78
End: 2023-09-01
Payer: MEDICARE

## 2023-09-01 ENCOUNTER — LABORATORY RESULT (OUTPATIENT)
Age: 78
End: 2023-09-01

## 2023-09-01 VITALS
BODY MASS INDEX: 31.09 KG/M2 | TEMPERATURE: 96.4 F | SYSTOLIC BLOOD PRESSURE: 146 MMHG | DIASTOLIC BLOOD PRESSURE: 84 MMHG | WEIGHT: 170 LBS | OXYGEN SATURATION: 94 % | HEART RATE: 71 BPM

## 2023-09-01 VITALS — DIASTOLIC BLOOD PRESSURE: 70 MMHG | SYSTOLIC BLOOD PRESSURE: 144 MMHG

## 2023-09-01 PROCEDURE — 99214 OFFICE O/P EST MOD 30 MIN: CPT | Mod: 25

## 2023-09-01 PROCEDURE — 36415 COLL VENOUS BLD VENIPUNCTURE: CPT

## 2023-09-01 RX ORDER — FLUCONAZOLE 150 MG/1
150 TABLET ORAL
Qty: 2 | Refills: 0 | Status: DISCONTINUED | COMMUNITY
Start: 2023-08-17 | End: 2023-09-01

## 2023-09-01 RX ORDER — AMOXICILLIN AND CLAVULANATE POTASSIUM 500; 125 MG/1; MG/1
500-125 TABLET, FILM COATED ORAL TWICE DAILY
Qty: 10 | Refills: 0 | Status: DISCONTINUED | COMMUNITY
Start: 2023-08-21 | End: 2023-09-01

## 2023-09-01 NOTE — HISTORY OF PRESENT ILLNESS
[de-identified] : Letitia comes in for medical follow-up and blood work.  She recovered after the latest UTI  with amoxicillin.  Her allergies were updated and reviewed.  She also brought in her last physical and labs blood work from March 2023.  She feels well and she is fasting.

## 2023-09-04 LAB
ALBUMIN SERPL ELPH-MCNC: 4.8 G/DL
ALP BLD-CCNC: 111 U/L
ALT SERPL-CCNC: 18 U/L
ANION GAP SERPL CALC-SCNC: 10 MMOL/L
APPEARANCE: CLEAR
AST SERPL-CCNC: 20 U/L
BILIRUB SERPL-MCNC: 0.3 MG/DL
BILIRUBIN URINE: NEGATIVE
BLOOD URINE: ABNORMAL
BUN SERPL-MCNC: 14 MG/DL
CALCIUM SERPL-MCNC: 10 MG/DL
CHLORIDE SERPL-SCNC: 101 MMOL/L
CHOLEST SERPL-MCNC: 189 MG/DL
CO2 SERPL-SCNC: 28 MMOL/L
COLOR: YELLOW
CREAT SERPL-MCNC: 0.75 MG/DL
EGFR: 81 ML/MIN/1.73M2
ESTIMATED AVERAGE GLUCOSE: 131 MG/DL
GLUCOSE QUALITATIVE U: NEGATIVE MG/DL
GLUCOSE SERPL-MCNC: 114 MG/DL
HBA1C MFR BLD HPLC: 6.2 %
HDLC SERPL-MCNC: 58 MG/DL
KETONES URINE: NEGATIVE MG/DL
LDLC SERPL CALC-MCNC: 112 MG/DL
LEUKOCYTE ESTERASE URINE: NEGATIVE
NITRITE URINE: NEGATIVE
NONHDLC SERPL-MCNC: 131 MG/DL
PH URINE: 7
POTASSIUM SERPL-SCNC: 4.8 MMOL/L
PROT SERPL-MCNC: 7.2 G/DL
PROTEIN URINE: NEGATIVE MG/DL
SODIUM SERPL-SCNC: 140 MMOL/L
SPECIFIC GRAVITY URINE: 1.02
TRIGL SERPL-MCNC: 108 MG/DL
UROBILINOGEN URINE: 0.2 MG/DL

## 2023-09-19 ENCOUNTER — APPOINTMENT (OUTPATIENT)
Dept: UROLOGY | Facility: CLINIC | Age: 78
End: 2023-09-19
Payer: MEDICARE

## 2023-09-19 VITALS
WEIGHT: 170 LBS | HEIGHT: 62 IN | BODY MASS INDEX: 31.28 KG/M2 | DIASTOLIC BLOOD PRESSURE: 80 MMHG | HEART RATE: 73 BPM | SYSTOLIC BLOOD PRESSURE: 145 MMHG

## 2023-09-19 DIAGNOSIS — Z87.891 PERSONAL HISTORY OF NICOTINE DEPENDENCE: ICD-10-CM

## 2023-09-19 PROCEDURE — 99203 OFFICE O/P NEW LOW 30 MIN: CPT

## 2023-09-20 PROBLEM — Z87.891 FORMER SMOKER: Status: ACTIVE | Noted: 2020-01-27

## 2023-09-21 ENCOUNTER — RESULT REVIEW (OUTPATIENT)
Age: 78
End: 2023-09-21

## 2023-09-21 ENCOUNTER — APPOINTMENT (OUTPATIENT)
Dept: HEMATOLOGY ONCOLOGY | Facility: CLINIC | Age: 78
End: 2023-09-21
Payer: MEDICARE

## 2023-09-21 VITALS
HEART RATE: 71 BPM | TEMPERATURE: 98 F | HEIGHT: 62 IN | WEIGHT: 179 LBS | BODY MASS INDEX: 32.94 KG/M2 | DIASTOLIC BLOOD PRESSURE: 75 MMHG | SYSTOLIC BLOOD PRESSURE: 135 MMHG | RESPIRATION RATE: 16 BRPM | OXYGEN SATURATION: 91 %

## 2023-09-21 DIAGNOSIS — D04.9 CARCINOMA IN SITU OF SKIN, UNSPECIFIED: ICD-10-CM

## 2023-09-21 PROCEDURE — 99205 OFFICE O/P NEW HI 60 MIN: CPT | Mod: 25

## 2023-09-21 PROCEDURE — G2212 PROLONG OUTPT/OFFICE VIS: CPT

## 2023-09-21 RX ORDER — TRAMADOL HYDROCHLORIDE 50 MG/1
50 TABLET, COATED ORAL
Qty: 30 | Refills: 0 | Status: COMPLETED | COMMUNITY
Start: 2023-06-09

## 2023-09-21 RX ORDER — AMOXICILLIN AND CLAVULANATE POTASSIUM 500; 125 MG/1; MG/1
500-125 TABLET, FILM COATED ORAL
Qty: 14 | Refills: 0 | Status: DISCONTINUED | COMMUNITY
Start: 2023-09-19 | End: 2023-09-21

## 2023-09-28 ENCOUNTER — NON-APPOINTMENT (OUTPATIENT)
Age: 78
End: 2023-09-28

## 2023-10-27 ENCOUNTER — APPOINTMENT (OUTPATIENT)
Dept: GASTROENTEROLOGY | Facility: CLINIC | Age: 78
End: 2023-10-27
Payer: MEDICARE

## 2023-10-27 VITALS
DIASTOLIC BLOOD PRESSURE: 88 MMHG | HEIGHT: 62 IN | BODY MASS INDEX: 32.2 KG/M2 | SYSTOLIC BLOOD PRESSURE: 149 MMHG | WEIGHT: 175 LBS

## 2023-10-27 DIAGNOSIS — Z86.010 PERSONAL HISTORY OF COLONIC POLYPS: ICD-10-CM

## 2023-10-27 PROCEDURE — 99204 OFFICE O/P NEW MOD 45 MIN: CPT

## 2023-10-27 RX ORDER — AMOXICILLIN 875 MG/1
875 TABLET, FILM COATED ORAL
Qty: 14 | Refills: 0 | Status: DISCONTINUED | COMMUNITY
Start: 2023-04-18 | End: 2023-10-27

## 2023-10-27 RX ORDER — MUPIROCIN 2 G/100G
2 CREAM TOPICAL
Qty: 15 | Refills: 0 | Status: DISCONTINUED | COMMUNITY
Start: 2023-06-16 | End: 2023-10-27

## 2023-11-21 ENCOUNTER — RESULT REVIEW (OUTPATIENT)
Age: 78
End: 2023-11-21

## 2023-11-27 ENCOUNTER — APPOINTMENT (OUTPATIENT)
Dept: BREAST CENTER | Facility: CLINIC | Age: 78
End: 2023-11-27
Payer: MEDICARE

## 2023-11-27 VITALS — HEIGHT: 62 IN | WEIGHT: 178 LBS | BODY MASS INDEX: 32.76 KG/M2

## 2023-11-27 DIAGNOSIS — Z90.11 ACQUIRED ABSENCE OF RIGHT BREAST AND NIPPLE: ICD-10-CM

## 2023-11-27 PROCEDURE — 99213 OFFICE O/P EST LOW 20 MIN: CPT

## 2023-11-28 PROBLEM — Z90.11 HISTORY OF PARTIAL MASTECTOMY OF RIGHT BREAST: Status: ACTIVE | Noted: 2023-11-21

## 2023-11-29 ENCOUNTER — APPOINTMENT (OUTPATIENT)
Dept: GASTROENTEROLOGY | Facility: CLINIC | Age: 78
End: 2023-11-29

## 2024-01-09 ENCOUNTER — APPOINTMENT (OUTPATIENT)
Dept: UROLOGY | Facility: CLINIC | Age: 79
End: 2024-01-09
Payer: MEDICARE

## 2024-01-09 VITALS
HEART RATE: 63 BPM | SYSTOLIC BLOOD PRESSURE: 128 MMHG | WEIGHT: 178 LBS | BODY MASS INDEX: 32.76 KG/M2 | HEIGHT: 62 IN | DIASTOLIC BLOOD PRESSURE: 75 MMHG

## 2024-01-09 PROCEDURE — 99214 OFFICE O/P EST MOD 30 MIN: CPT

## 2024-01-14 NOTE — ASSESSMENT
[FreeTextEntry1] : Patient is a 78 female here for follow-up today regards to her overactive bladder and recurrent UTIs.  She completed a course of pelvic floor therapy and feels like she is doing slightly better but continues to have intermittent UTI symptoms.  Her breast cancer is being managed by Dr. Hernandez of oncology.  She is on able to give us a urine sample today unfortunately.  Assessment and plan 1.  Recurrent UTIs 2.  Overactive bladder 3.  Breast cancer managed by oncology I reviewed her CBC CMP methylmalonic acid serum folate levels and iron profile from Dr. Hernandez in September 2023.  I recommend getting a renal ultrasound and cystoscopy to further evaluate and continue her pelvic floor exercises.  She will consider.

## 2024-02-29 ENCOUNTER — APPOINTMENT (OUTPATIENT)
Dept: GERIATRICS | Facility: CLINIC | Age: 79
End: 2024-02-29
Payer: MEDICARE

## 2024-02-29 VITALS
HEART RATE: 68 BPM | SYSTOLIC BLOOD PRESSURE: 122 MMHG | BODY MASS INDEX: 32.76 KG/M2 | WEIGHT: 178 LBS | OXYGEN SATURATION: 96 % | HEIGHT: 62 IN | TEMPERATURE: 95.5 F | DIASTOLIC BLOOD PRESSURE: 80 MMHG

## 2024-02-29 PROCEDURE — 99214 OFFICE O/P EST MOD 30 MIN: CPT

## 2024-02-29 PROCEDURE — G2211 COMPLEX E/M VISIT ADD ON: CPT

## 2024-02-29 NOTE — HISTORY OF PRESENT ILLNESS
[FreeTextEntry1] : She had a head cold starting 5/14.   wasSick 1 week before that.  2 weeks ago she went to the urgent care and found to be COVID-positive with x-ray of chest showing left lower lobe infiltrates.  She was started on Augmentin and doxycycline for 5 days.  She says that she is feeling better and has no fever or chills.  She does experience chills like symptoms only at night intermittently sometimes. Labs drawn at urgent care showed sodium was 134 at last visit.  She still has had cold-like symptoms where her ears feel clogged, some cough with phlegm but it does not bother her.  She does have runny nose.  She has sore throat in the morning due to CPAP machine.  She has history of right lung lobectomy secondary to lung cancer. Also has history of right-sided breast cancer status postlumpectomy. She has been diagnosed with COPD. Has completed the course of antibiotics.

## 2024-03-04 ENCOUNTER — APPOINTMENT (OUTPATIENT)
Dept: GERIATRICS | Facility: CLINIC | Age: 79
End: 2024-03-04
Payer: MEDICARE

## 2024-03-04 VITALS
BODY MASS INDEX: 33.77 KG/M2 | SYSTOLIC BLOOD PRESSURE: 126 MMHG | HEART RATE: 68 BPM | TEMPERATURE: 97.4 F | OXYGEN SATURATION: 95 % | WEIGHT: 176.6 LBS | DIASTOLIC BLOOD PRESSURE: 80 MMHG | HEIGHT: 60.5 IN

## 2024-03-04 DIAGNOSIS — F51.01 PRIMARY INSOMNIA: ICD-10-CM

## 2024-03-04 DIAGNOSIS — Z71.89 OTHER SPECIFIED COUNSELING: ICD-10-CM

## 2024-03-04 DIAGNOSIS — J43.9 EMPHYSEMA, UNSPECIFIED: ICD-10-CM

## 2024-03-04 PROBLEM — E55.9 VITAMIN D DEFICIENCY: Status: ACTIVE | Noted: 2024-03-04

## 2024-03-04 PROCEDURE — 99215 OFFICE O/P EST HI 40 MIN: CPT

## 2024-03-04 PROCEDURE — G2211 COMPLEX E/M VISIT ADD ON: CPT

## 2024-03-04 RX ORDER — ANASTROZOLE TABLETS 1 MG/1
1 TABLET ORAL DAILY
Qty: 90 | Refills: 3 | Status: ACTIVE | COMMUNITY
Start: 1900-01-01 | End: 1900-01-01

## 2024-03-04 RX ORDER — OMEGA-3/DHA/EPA/FISH OIL 300-1000MG
CAPSULE ORAL
Refills: 0 | Status: DISCONTINUED | COMMUNITY
End: 2024-03-04

## 2024-03-04 RX ORDER — LEVOTHYROXINE SODIUM 0.07 MG/1
75 TABLET ORAL DAILY
Qty: 90 | Refills: 3 | Status: ACTIVE | COMMUNITY
Start: 1900-01-01 | End: 1900-01-01

## 2024-03-04 RX ORDER — SPIRONOLACTONE AND HYDROCHLOROTHIAZIDE 25; 25 MG/1; MG/1
25-25 TABLET, FILM COATED ORAL DAILY
Refills: 0 | Status: DISCONTINUED | COMMUNITY
Start: 2023-06-02 | End: 2024-03-04

## 2024-03-04 RX ORDER — CHOLECALCIFEROL (VITAMIN D3) 50 MCG
50 MCG TABLET ORAL DAILY
Refills: 0 | Status: ACTIVE | COMMUNITY

## 2024-03-04 RX ORDER — MAGNESIUM OXIDE 200 MG
10 TABLET,CHEWABLE ORAL AT BEDTIME
Refills: 0 | Status: ACTIVE | COMMUNITY

## 2024-03-04 NOTE — PHYSICAL EXAM
[No Respiratory Distress] : no respiratory distress [No Acc Muscle Use] : no accessory muscle use [Respiration, Rhythm And Depth] : normal respiratory rhythm and effort [Auscultation Breath Sounds / Voice Sounds] : lungs were clear to auscultation bilaterally [Normal S1, S2] : normal S1 and S2 [Heart Rate And Rhythm] : heart rate was normal and rhythm regular [Version 1] : Word list version 1 - Banana, Sunrise, Chair [3 Words (3 points)] : 3 words (3 points) [Normal Clock Drawn, with correct arm position (2 points)] : normal clock drawn, with correct arm position (2 points) [5 Points] : 5 points

## 2024-03-04 NOTE — HISTORY OF PRESENT ILLNESS
[FreeTextEntry1] : LETY PETERSON is a 78 year yo White  female is coming in today to establish care. Patient has PMHx as listed below    #Stage Ib right breast cancer status post partial mastectomy 2020 Classic invasive lobular carcinoma, ER/LA positive HER2 FISH negative Dr. Torres oncology, Dr. Garcia Whole breast radiation, anastrozole Followed by surg, next mammogram 12/24  #Overactive bladder followed by urology; Dr. Arroyo pelvic floor exercises  renal U/S + cystoscopy urinating 6-8 times a day. 2x a night  #h/o colonic polyps and now BRBPR h/o AVMs, hemorrhoids, polyps(2019 colonoscopy); repeat in 3-5 yrs Followed by GI. dr. Frey, Colonoscopy this year.  never had chemo ever in life   #Right hip osteoarthritis THR june 2023 on Vitamin D3  #ALBINO on CPAP  #COPD with lung cancer RLL lobectomy May 2019 got pulm rehab in past  #HTN on labetalol 50mg BID, spironolactone-HCTZ 25-25 will stop today spirono-hctz. Has BP cuff at home.   #Hypothyroidism On levothyroxine  #Insomnia sleeping about 4-6hrs at night melatonin 10mg QHS; try at bednight not at 3 am  #Chronic low back pain/ Spinal stenosis  takes tylenol; Bleeds on NSAIDs  Education: nursing school; LPN Work: LPN; retired ETOH/Smoking: smoked 1 pack a day for 30 yrs; no alcohol Weight loss/malnutrition: no : , 2 kids Immunization:  checked Screening: low dose CT lung followed by pulm Depression screening: neg Medication reconciliation: reconciled Allergies: reconciled, wants to see allergist     4M Geriatric Screening Tests   Based on The 4Ms While Caring for Older Adults, an evidence-based focus on the key areas of mentation, mobility, medications, and what matters most (a guide by the [place] [employer] and the [employer])     Medications: -Anticholinergic burden:      Mobility:   -Chronic pain: no -Constipation: no    Frail Scale: 3/5   -Are you fatigued? no -Can you walk up one flight of stairs? no -Can you walk one block? no -Do you have more than 5 illnesses? yes -Have you lost more than 5% of your weight in last 6 months? no       Mentation: -Hx of dementia:no -h/o delirium:no -Cognitive enhancing agents:no       Sleep:  has sleep apnea sleeping 4-6 hrs     Matters Most  []    02/29/24 She had a head cold starting 5/14.   was Sick 1 week before that.  2 weeks ago she went to the urgent care and found to be COVID-positive with x-ray of chest showing left lower lobe infiltrates.  She was started on Augmentin and doxycycline for 5 days.  She says that she is feeling better and has no fever or chills.  She does experience chills like symptoms only at night intermittently sometimes. Labs drawn at urgent care showed sodium was 134 at last visit.  She still has had cold-like symptoms where her ears feel clogged, some cough with phlegm but it does not bother her.  She does have runny nose.  She has sore throat in the morning due to CPAP machine.  She has history of right lung lobectomy secondary to lung cancer. Also has history of right-sided breast cancer status postlumpectomy. She has been diagnosed with COPD. Has completed the course of antibiotics. [Patient reported colon/rectal cancer screening was abnormal] : Patient reported colon/rectal cancer screening was abnormal [BreastSonogramDate] : 07/22 [TextBox_25] : getting in july 2024 [TextBox_19] : To follow-up with GI [FreeTextEntry3] : 11/23 [FreeTextEntry4] : has living will and health care proxy would not want to be braid dead Need MOLST form.

## 2024-03-05 ENCOUNTER — RESULT REVIEW (OUTPATIENT)
Age: 79
End: 2024-03-05

## 2024-03-08 ENCOUNTER — APPOINTMENT (OUTPATIENT)
Dept: GERIATRICS | Facility: CLINIC | Age: 79
End: 2024-03-08
Payer: MEDICARE

## 2024-03-08 VITALS
HEIGHT: 60.5 IN | TEMPERATURE: 97.5 F | OXYGEN SATURATION: 93 % | DIASTOLIC BLOOD PRESSURE: 70 MMHG | BODY MASS INDEX: 34.43 KG/M2 | SYSTOLIC BLOOD PRESSURE: 122 MMHG | WEIGHT: 180 LBS | HEART RATE: 65 BPM

## 2024-03-08 DIAGNOSIS — Z80.1 FAMILY HISTORY OF MALIGNANT NEOPLASM OF TRACHEA, BRONCHUS AND LUNG: ICD-10-CM

## 2024-03-08 DIAGNOSIS — Z00.00 ENCOUNTER FOR GENERAL ADULT MEDICAL EXAMINATION W/OUT ABNORMAL FINDINGS: ICD-10-CM

## 2024-03-08 DIAGNOSIS — E55.9 VITAMIN D DEFICIENCY, UNSPECIFIED: ICD-10-CM

## 2024-03-08 PROCEDURE — G0439: CPT

## 2024-03-08 PROCEDURE — G0444 DEPRESSION SCREEN ANNUAL: CPT | Mod: 59

## 2024-03-08 PROCEDURE — G2211 COMPLEX E/M VISIT ADD ON: CPT | Mod: NC,1L

## 2024-03-08 RX ORDER — LABETALOL HYDROCHLORIDE 100 MG/1
100 TABLET, FILM COATED ORAL TWICE DAILY
Refills: 0 | Status: DISCONTINUED | COMMUNITY
End: 2024-03-08

## 2024-03-08 RX ORDER — FLUCONAZOLE 100 MG/1
100 TABLET ORAL
Qty: 2 | Refills: 0 | Status: DISCONTINUED | COMMUNITY
Start: 2023-09-19 | End: 2024-03-08

## 2024-03-08 RX ORDER — SIMVASTATIN 40 MG/1
40 TABLET, FILM COATED ORAL
Qty: 90 | Refills: 3 | Status: ACTIVE | COMMUNITY
Start: 1900-01-01 | End: 1900-01-01

## 2024-03-08 RX ORDER — ALBUTEROL SULFATE
POWDER (GRAM) MISCELLANEOUS
Refills: 0 | Status: ACTIVE | COMMUNITY

## 2024-03-08 RX ORDER — IPRATROPIUM BROMIDE 21 UG/1
0.03 SPRAY NASAL 3 TIMES DAILY
Qty: 1 | Refills: 0 | Status: DISCONTINUED | COMMUNITY
Start: 2024-02-29 | End: 2024-03-08

## 2024-03-08 NOTE — PHYSICAL EXAM
[No Acc Muscle Use] : no accessory muscle use [No Respiratory Distress] : no respiratory distress [Respiration, Rhythm And Depth] : normal respiratory rhythm and effort [Auscultation Breath Sounds / Voice Sounds] : lungs were clear to auscultation bilaterally

## 2024-03-08 NOTE — HISTORY OF PRESENT ILLNESS
[Name: ___] : Health Care Proxy's Name: [unfilled]  [Relationship: ___] : Relationship: [unfilled] [PMH Reviewed and Updated] : past medical history reviewed and updated [PSH Reviewed and Updated] : past surgical history reviewed and updated [Medication and Allergies Reconciled] : medication and allergies reconciled [Family History Reviewed and Updated] : family history reviewed and updated [8] : 8 [Retired] : retired from work [None] : The patient has no concerns about alcohol abuse [Smoking Status Reviewed and Updated] : Smoking status was reviewed and updated [Never] : has never used illicit drugs [General Adherence] : and is generally adherent [3 or More Times Per Week] : exercises 3 or more times per week [Compliant with medications] : compliant with medications [Adequate] : adequate [Spouse] : spouse [Extended Family] : extended family [Fully Independent] : fully independent [Drives without concerns] : drives without concerns [No history of falls] : no history of falls [Advanced Directives Discussed] : discussed at today's visit [Patient Has Full Capacity] : this patient has full decision making capacity for discussion of advance care planning [Patient Unclear of wishes] : patient unclear of wishes [Patient reported osteoporosis screening was normal] : Patient reported osteoporosis screening was normal [Patient reported hearing was normal] : Patient reported hearing was normal [Patient reported vision is abnormal] : Patient reported vision is abnormal [Patient reported Patient reported dental screening is normal] : Patient reported dental screening is normal [Patient reported colon/rectal cancer screening was abnormal] : Patient reported colon/rectal cancer screening was abnormal [Patient reported breast cancer screening was normal] : Patient reported breast cancer screening was normal [No falls in past year] : Patient reported no falls in the past year [Completely Independent] : Completely independent. [Smoke Detector] : smoke detector [Carbon Monoxide Detector] : carbon monoxide detector [Shower Chair] : shower chair [Anti-Slip Measures] : anti-slip measures [0] : 2) Feeling down, depressed, or hopeless: Not at all (0) [PHQ-2 Negative - No further assessment needed] : PHQ-2 Negative - No further assessment needed [I have developed a follow-up plan documented below in the note.] : I have developed a follow-up plan documented below in the note. [Designated Healthcare Proxy] : Designated healthcare proxy [DNR] : DNR [I will adhere to the patient's wishes.] : I will adhere to the patient's wishes. [Over the Past 2 Weeks, Have You Felt Down, Depressed, or Hopeless?] : 1.) Over the past 2 weeks, have you felt down, depressed, or hopeless? No [Over the Past 2 Weeks, Have You Felt Little Interest or Pleasure Doing Things?] : 2.) Over the past 2 weeks, have you felt little interest or pleasure doing things? No [Unable To Manage Meds] : ability to manage ~his/her~ medications [Intolerance] : no intolerance ["Doesn't Seem To Help"] : belief that the medication is helping [de-identified] : back pain; not done PT; doing hip PT [de-identified] : hip exercises [FreeTextEntry1] : started on inhaler by pulmonology. having trouble breathing sometimes.  [facility], [facility]  take BP once a day d/c labetalol  Cardiologist Dr. Hui, Dr. Holm, urologist for recurrent UTI      LETY PETERSON is a 78 year yo White  female is coming in today to establish care. Patient has PMHx as listed below    #Stage Ib right breast cancer status post partial mastectomy 2020 Classic invasive lobular carcinoma, ER/IA positive HER2 FISH negative Dr. Torres oncology, Dr. Garcia Whole breast radiation, anastrozole Followed by surg, next mammogram 12/24  #Overactive bladder followed by urology; Dr. Arroyo pelvic floor exercises  renal U/S + cystoscopy urinating 6-8 times a day. 2x a night  #h/o colonic polyps and now BRBPR h/o AVMs, hemorrhoids, polyps(2019 colonoscopy); repeat in 3-5 yrs Followed by GI. dr. Frey, Colonoscopy this year.  never had chemo ever in life   #Right hip osteoarthritis THR june 2023 on Vitamin D3  #ALBINO on CPAP  #COPD with lung cancer RLL lobectomy May 2019 got pulm rehab in past  #HTN on labetalol 50mg BID, spironolactone-HCTZ 25-25 will stop today spirono-hctz. Has BP cuff at home.   #Hypothyroidism On levothyroxine  #Insomnia sleeping about 4-6hrs at night melatonin 10mg QHS; try at bednight not at 3 am  #Chronic low back pain/ Spinal stenosis  takes tylenol; Bleeds on NSAIDs; does not want PT  Education: nursing school; LPN Work: LPN; retired ETOH/Smoking: smoked 1 pack a day for 30 yrs; no alcohol Weight loss/malnutrition: no : , 2 kids Immunization:  checked Screening: low dose CT lung followed by pulm Depression screening: neg Medication reconciliation: reconciled Allergies: reconciled, wants to see allergist     4M Geriatric Screening Tests   Based on The 4Ms While Caring for Older Adults, an evidence-based focus on the key areas of mentation, mobility, medications, and what matters most (a guide by the [place] [employer] and the [employer])     Medications: -Anticholinergic burden:      Mobility:   -Chronic pain: no -Constipation: no    Frail Scale: 3/5   -Are you fatigued? no -Can you walk up one flight of stairs? no -Can you walk one block? no -Do you have more than 5 illnesses? yes -Have you lost more than 5% of your weight in last 6 months? no       Mentation: -Hx of dementia:no -h/o delirium:no -Cognitive enhancing agents:no       Sleep:  has sleep apnea sleeping 4-6 hrs     Matters Most  []    02/29/24 She had a head cold starting 5/14.   was Sick 1 week before that.  2 weeks ago she went to the urgent care and found to be COVID-positive with x-ray of chest showing left lower lobe infiltrates.  She was started on Augmentin and doxycycline for 5 days.  She says that she is feeling better and has no fever or chills.  She does experience chills like symptoms only at night intermittently sometimes. Labs drawn at urgent care showed sodium was 134 at last visit.  She still has had cold-like symptoms where her ears feel clogged, some cough with phlegm but it does not bother her.  She does have runny nose.  She has sore throat in the morning due to CPAP machine.  She has history of right lung lobectomy secondary to lung cancer. Also has history of right-sided breast cancer status postlumpectomy. She has been diagnosed with COPD. Has completed the course of antibiotics. [BreastSonogramDate] : 07/22 [TextBox_25] : getting in july 2024 [TextBox_37] : every year, get a new optometrist [TextBox_43] : every 6 months, Sukumar Thornton [TextBox_19] : To follow-up with GI; Dr. Villeda; 5 polyps each time, every 3 years? [Mammogramdate] : 01/21 [FreeTextEntry3] : 11/23 [FreeTextEntry7] : lung ca screen tuesday by Dr. Tran [Grab Bars] : no grab bars [Night Light] : no night light [de-identified] : getting grab bars;  [FreeTextEntry4] :  Cipriano Cordoba and Daughter Eve Ennis" Are HCP Intubate for short term reversible causes

## 2024-03-12 ENCOUNTER — RESULT REVIEW (OUTPATIENT)
Age: 79
End: 2024-03-12

## 2024-03-15 ENCOUNTER — NON-APPOINTMENT (OUTPATIENT)
Age: 79
End: 2024-03-15

## 2024-03-18 ENCOUNTER — NON-APPOINTMENT (OUTPATIENT)
Age: 79
End: 2024-03-18

## 2024-03-18 ENCOUNTER — APPOINTMENT (OUTPATIENT)
Dept: INTERNAL MEDICINE | Facility: CLINIC | Age: 79
End: 2024-03-18

## 2024-03-18 LAB — BACTERIA UR CULT: ABNORMAL

## 2024-03-21 ENCOUNTER — RESULT REVIEW (OUTPATIENT)
Age: 79
End: 2024-03-21

## 2024-03-21 ENCOUNTER — NON-APPOINTMENT (OUTPATIENT)
Age: 79
End: 2024-03-21

## 2024-03-21 ENCOUNTER — APPOINTMENT (OUTPATIENT)
Dept: HEMATOLOGY ONCOLOGY | Facility: CLINIC | Age: 79
End: 2024-03-21

## 2024-03-21 VITALS
DIASTOLIC BLOOD PRESSURE: 73 MMHG | TEMPERATURE: 97.7 F | OXYGEN SATURATION: 93 % | WEIGHT: 183 LBS | RESPIRATION RATE: 16 BRPM | BODY MASS INDEX: 35 KG/M2 | HEIGHT: 60.5 IN | SYSTOLIC BLOOD PRESSURE: 159 MMHG | HEART RATE: 78 BPM

## 2024-03-21 RX ORDER — AMOXICILLIN 500 MG/1
500 TABLET, FILM COATED ORAL
Qty: 12 | Refills: 0 | Status: DISCONTINUED | COMMUNITY
Start: 2023-10-12

## 2024-03-21 RX ORDER — AMOXICILLIN AND CLAVULANATE POTASSIUM 875; 125 MG/1; MG/1
875-125 TABLET, COATED ORAL
Qty: 10 | Refills: 0 | Status: COMPLETED | COMMUNITY
Start: 2024-03-11 | End: 2024-03-21

## 2024-03-21 RX ORDER — CICLOPIROX 7.7 MG/G
0.77 GEL TOPICAL
Qty: 45 | Refills: 0 | Status: DISCONTINUED | COMMUNITY
Start: 2024-01-26

## 2024-03-21 RX ORDER — AMLODIPINE BESYLATE 2.5 MG/1
2.5 TABLET ORAL DAILY
Qty: 60 | Refills: 0 | Status: COMPLETED | COMMUNITY
Start: 2024-03-11 | End: 2024-03-21

## 2024-03-21 RX ORDER — FLUCONAZOLE 150 MG/1
150 TABLET ORAL
Qty: 1 | Refills: 0 | Status: COMPLETED | COMMUNITY
Start: 2024-03-11 | End: 2024-03-21

## 2024-03-21 RX ORDER — ALBUTEROL SULFATE 90 UG/1
108 (90 BASE) INHALANT RESPIRATORY (INHALATION)
Qty: 20 | Refills: 0 | Status: ACTIVE | COMMUNITY
Start: 2024-02-16

## 2024-03-21 RX ORDER — DOXYCYCLINE HYCLATE 100 MG/1
100 CAPSULE ORAL
Qty: 10 | Refills: 0 | Status: DISCONTINUED | COMMUNITY
Start: 2024-02-20

## 2024-03-21 NOTE — REVIEW OF SYSTEMS
[Fatigue] : fatigue [SOB on Exertion] : shortness of breath during exertion [Joint Pain] : joint pain [Joint Stiffness] : joint stiffness [Insomnia] : insomnia [Muscle Pain] : muscle pain [Negative] : Allergic/Immunologic

## 2024-03-25 ENCOUNTER — APPOINTMENT (OUTPATIENT)
Dept: UROLOGY | Facility: CLINIC | Age: 79
End: 2024-03-25
Payer: MEDICARE

## 2024-03-25 DIAGNOSIS — N39.0 URINARY TRACT INFECTION, SITE NOT SPECIFIED: ICD-10-CM

## 2024-03-25 PROCEDURE — 99441: CPT

## 2024-03-28 ENCOUNTER — APPOINTMENT (OUTPATIENT)
Dept: HEMATOLOGY ONCOLOGY | Facility: CLINIC | Age: 79
End: 2024-03-28
Payer: MEDICARE

## 2024-03-28 VITALS
SYSTOLIC BLOOD PRESSURE: 147 MMHG | WEIGHT: 184.38 LBS | HEIGHT: 60.5 IN | RESPIRATION RATE: 16 BRPM | TEMPERATURE: 98.1 F | OXYGEN SATURATION: 96 % | DIASTOLIC BLOOD PRESSURE: 75 MMHG | HEART RATE: 78 BPM | BODY MASS INDEX: 35.26 KG/M2

## 2024-03-28 DIAGNOSIS — C50.919 MALIGNANT NEOPLASM OF UNSPECIFIED SITE OF UNSPECIFIED FEMALE BREAST: ICD-10-CM

## 2024-03-28 DIAGNOSIS — R91.8 OTHER NONSPECIFIC ABNORMAL FINDING OF LUNG FIELD: ICD-10-CM

## 2024-03-28 PROCEDURE — G2211 COMPLEX E/M VISIT ADD ON: CPT

## 2024-03-28 PROCEDURE — 99215 OFFICE O/P EST HI 40 MIN: CPT

## 2024-03-28 NOTE — ASSESSMENT
[FreeTextEntry1] : Right breast Invasive lobular carcinoma with was treated with partial mastectomy and sentinel node biopsy followed by radiation and anastrozole. The tumor was 7 mm and ER/FL positive and Her2 neg with Ki-67 of 25%. One of the LNs was positive for microscopic disease,  T1c, N1 M1, M0: stage 1a Oncotype reported of 17 She was previously seen and followed by Dr. Jami Asif at [facility] and switched care September 2023 Been on Anatrazole in 2020 DEXA (7/2022)- Normal but decrease in right hip  She is seen today for follow-up Issues as outlined in HPI Labs reviewed analyzed and discussed Next mammogram in Nov 2024 with Dr Beal Tolerating treatment well Repeat DEXA in July 2024.  Order sent.  Patient advised to call after the DEXA scan to review the results  Right lung Squamous cell CA Diagnosed 2019 s/p surgery and radiation Pathological stage IB [id number]. 5 negative nodes Was seen by Dr Asif and not recommended adjuvant therapy Advised to have CT annually. CT scans reviewed analyzed and discussed.  From the cancer standpoint it is KARTIK  Left upper lobe subpleural opacity Incidentally found on CT chest Complains of shortness of breath but attributes to fluid overload Does not want to go to the hospital.  Has an appointment with Dr. Rivas in a few weeks.  She is aware to go to the hospital if her symptoms Lasix 40 mg p.o. as needed for worsening lower extremity edema/shortness of breath Advised to keep her feet elevated, use compression stockings as needed Explained that we might have to repeat the CT chest, but I will defer that to Dr. Rivas for now  H/O AVM Gets colonoscopies every 3 years with Dr Workman but switched to Dr Frey Last major bleeding was in 2019 Check CBC, ferritin periodically  Had Right hip surgery June 8, 2023  Social Hx: Lives with  in [place], [place] has 2 daughters  Quit smoking 2006 Reports that she Has a sister who is my patient for lung ca  Patient had multiple questions which were answered to satisfaction  Labs in 6 months or sooner if needed.  CBC, CMP, ferritin, vitamin D  OV few days later

## 2024-03-28 NOTE — PHYSICAL EXAM
[Restricted in physically strenuous activity but ambulatory and able to carry out work of a light or sedentary nature] : Status 1- Restricted in physically strenuous activity but ambulatory and able to carry out work of a light or sedentary nature, e.g., light house work, office work [de-identified] : decreased BS [Normal] : grossly intact [de-identified] : Bilateral lower extremity edema

## 2024-03-28 NOTE — REASON FOR VISIT
[Initial Consultation] : an initial consultation [Follow-Up Visit] : a follow-up [FreeTextEntry2] : breast cancer

## 2024-03-29 ENCOUNTER — APPOINTMENT (OUTPATIENT)
Dept: GERIATRICS | Facility: CLINIC | Age: 79
End: 2024-03-29

## 2024-03-29 ENCOUNTER — NON-APPOINTMENT (OUTPATIENT)
Age: 79
End: 2024-03-29

## 2024-04-01 ENCOUNTER — APPOINTMENT (OUTPATIENT)
Dept: GERIATRICS | Facility: CLINIC | Age: 79
End: 2024-04-01

## 2024-04-01 ENCOUNTER — APPOINTMENT (OUTPATIENT)
Dept: GERIATRICS | Facility: CLINIC | Age: 79
End: 2024-04-01
Payer: MEDICARE

## 2024-04-01 VITALS
WEIGHT: 183 LBS | TEMPERATURE: 98 F | BODY MASS INDEX: 35 KG/M2 | DIASTOLIC BLOOD PRESSURE: 84 MMHG | HEIGHT: 60.5 IN | OXYGEN SATURATION: 96 % | SYSTOLIC BLOOD PRESSURE: 128 MMHG | HEART RATE: 74 BPM

## 2024-04-01 DIAGNOSIS — C50.919 MALIGNANT NEOPLASM OF UNSPECIFIED SITE OF UNSPECIFIED FEMALE BREAST: ICD-10-CM

## 2024-04-01 DIAGNOSIS — E03.9 HYPOTHYROIDISM, UNSPECIFIED: ICD-10-CM

## 2024-04-01 PROCEDURE — 36415 COLL VENOUS BLD VENIPUNCTURE: CPT

## 2024-04-01 PROCEDURE — 99215 OFFICE O/P EST HI 40 MIN: CPT

## 2024-04-01 PROCEDURE — G2211 COMPLEX E/M VISIT ADD ON: CPT

## 2024-04-02 LAB
ANION GAP SERPL CALC-SCNC: 13 MMOL/L
APPEARANCE: CLEAR
BILIRUBIN URINE: NEGATIVE
BLOOD URINE: NEGATIVE
BUN SERPL-MCNC: 15 MG/DL
CALCIUM SERPL-MCNC: 9 MG/DL
CHLORIDE SERPL-SCNC: 105 MMOL/L
CO2 SERPL-SCNC: 24 MMOL/L
COLOR: YELLOW
CREAT SERPL-MCNC: 0.74 MG/DL
EGFR: 82 ML/MIN/1.73M2
GLUCOSE QUALITATIVE U: NEGATIVE MG/DL
GLUCOSE SERPL-MCNC: 119 MG/DL
KETONES URINE: NEGATIVE MG/DL
LEUKOCYTE ESTERASE URINE: NEGATIVE
NITRITE URINE: NEGATIVE
PH URINE: 6.5
POTASSIUM SERPL-SCNC: 3.8 MMOL/L
PROTEIN URINE: NEGATIVE MG/DL
SODIUM SERPL-SCNC: 142 MMOL/L
SPECIFIC GRAVITY URINE: 1.01
UROBILINOGEN URINE: 0.2 MG/DL

## 2024-04-04 VITALS
WEIGHT: 183 LBS | RESPIRATION RATE: 16 BRPM | SYSTOLIC BLOOD PRESSURE: 128 MMHG | TEMPERATURE: 98 F | BODY MASS INDEX: 35.15 KG/M2 | HEART RATE: 74 BPM | DIASTOLIC BLOOD PRESSURE: 84 MMHG | OXYGEN SATURATION: 96 %

## 2024-04-04 PROBLEM — C50.919 BREAST CANCER: Status: ACTIVE | Noted: 2020-01-27

## 2024-04-04 PROBLEM — E03.9 HYPOTHYROIDISM, UNSPECIFIED TYPE: Status: ACTIVE | Noted: 2020-01-27

## 2024-04-04 PROBLEM — E03.9 ACQUIRED HYPOTHYROIDISM: Status: ACTIVE | Noted: 2023-06-02

## 2024-04-04 NOTE — ASSESSMENT
[FreeTextEntry1] : extensive discussion with patient on Friday via telephone and again today in office she is upset about medication changes we discussed the intentions of trying to change medications that could negatively affect her as she ages but being on 3 agents now instead of her initial two is frustrating will check labs today to rule out reversible causes of edema if all negative will slowly down titrate CCB (amlodipine) and increase losartan I suspect amlodipine could be contributing to edema  labs drawn in office

## 2024-04-04 NOTE — PHYSICAL EXAM
[Alert] : alert [Sclera] : the sclera and conjunctiva were normal [EOMI] : extraocular movements were intact [Normal Appearance] : the appearance of the neck was normal [No Respiratory Distress] : no respiratory distress [No Acc Muscle Use] : no accessory muscle use [Respiration, Rhythm And Depth] : normal respiratory rhythm and effort [Auscultation Breath Sounds / Voice Sounds] : lungs were clear to auscultation bilaterally [Normal PMI] : the apical impulse was normal [Normal S1, S2] : normal S1 and S2 [Heart Rate And Rhythm] : heart rate was normal and rhythm regular [Bowel Sounds] : normal bowel sounds [Abdomen Tenderness] : non-tender [Abdomen Soft] : soft [No CVA Tenderness] : no CVA  tenderness [Normal Color / Pigmentation] : normal skin color and pigmentation

## 2024-04-04 NOTE — REVIEW OF SYSTEMS
[Fever] : no fever [Chills] : no chills [Feeling Tired] : feeling tired [Chest Pain] : no chest pain [Palpitations] : no palpitations [Cough] : no cough [SOB on Exertion] : no shortness of breath during exertion [Dysuria] : no dysuria [Dizziness] : no dizziness [Fainting] : no fainting

## 2024-04-04 NOTE — HISTORY OF PRESENT ILLNESS
[FreeTextEntry1] : saw Dr Lakhani in March for [facility] plan was to stop labetalol 50mg BID, spironolactone-HCTZ 25-25 when BP remained high started amlodipine 2.5 mg daily  BP remained elevated and covering NP raised amlodipine with little benefit and recently added low dose losartan. Last week she spoke with hematology and noted that her low extremities had become markedly elevated. Hematology/oncology advised lasix.  Lasix has helped with LE edema.    BP is better controlled at this time but she does not want to remain on 3 agents. [No falls in past year] : Patient reported no falls in the past year [Completely Independent] : Completely independent. [Smoke Detector] : smoke detector [0] : 2) Feeling down, depressed, or hopeless: Not at all (0) [PHQ-2 Negative - No further assessment needed] : PHQ-2 Negative - No further assessment needed [[id number]] : 0

## 2024-04-08 ENCOUNTER — APPOINTMENT (OUTPATIENT)
Dept: CARDIOLOGY | Facility: CLINIC | Age: 79
End: 2024-04-08
Payer: MEDICARE

## 2024-04-08 VITALS
BODY MASS INDEX: 34.81 KG/M2 | HEART RATE: 85 BPM | WEIGHT: 182 LBS | SYSTOLIC BLOOD PRESSURE: 140 MMHG | DIASTOLIC BLOOD PRESSURE: 70 MMHG | HEIGHT: 60.5 IN | OXYGEN SATURATION: 98 %

## 2024-04-08 PROCEDURE — 93000 ELECTROCARDIOGRAM COMPLETE: CPT

## 2024-04-08 PROCEDURE — 99213 OFFICE O/P EST LOW 20 MIN: CPT | Mod: 25

## 2024-04-08 PROCEDURE — 99203 OFFICE O/P NEW LOW 30 MIN: CPT | Mod: 25

## 2024-04-09 ENCOUNTER — NON-APPOINTMENT (OUTPATIENT)
Age: 79
End: 2024-04-09

## 2024-04-09 RX ORDER — FUROSEMIDE 40 MG/1
40 TABLET ORAL
Qty: 30 | Refills: 5 | Status: ACTIVE | COMMUNITY
Start: 2024-04-09 | End: 1900-01-01

## 2024-04-09 NOTE — REASON FOR VISIT
[Symptom and Test Evaluation] : symptom and test evaluation [Hypertension] : hypertension [FreeTextEntry3] : Dr Scotty Lakhani

## 2024-04-09 NOTE — PHYSICAL EXAM
[Well Developed] : well developed [Well Nourished] : well nourished [No Acute Distress] : no acute distress [Normal Conjunctiva] : normal conjunctiva [Normal Venous Pressure] : normal venous pressure [No Carotid Bruit] : no carotid bruit [Normal S1, S2] : normal S1, S2 [No Murmur] : no murmur [No Rub] : no rub [No Gallop] : no gallop [Clear Lung Fields] : clear lung fields [Good Air Entry] : good air entry [No Respiratory Distress] : no respiratory distress  [Soft] : abdomen soft [Non Tender] : non-tender [No Masses/organomegaly] : no masses/organomegaly [Normal Bowel Sounds] : normal bowel sounds [Normal Gait] : normal gait [No Edema] : no edema [No Cyanosis] : no cyanosis [No Clubbing] : no clubbing [No Varicosities] : no varicosities [No Rash] : no rash [No Skin Lesions] : no skin lesions [Moves all extremities] : moves all extremities [No Focal Deficits] : no focal deficits [Normal Speech] : normal speech [Alert and Oriented] : alert and oriented [Normal memory] : normal memory [Obese] : obese [Edema ___] : edema [unfilled] [de-identified] : Spider veins

## 2024-04-09 NOTE — HISTORY OF PRESENT ILLNESS
[FreeTextEntry1] : Patient has no history of myocardial infarction or congestive heart failure or chest pain syndrome.   Because of an abnormal nuclear stress test in 2006, she underwent cardiac catheterization which showed nonobstructive disease.  She has been under the care of Dr. Ravinder Zamora from and had a nuclear stress test in 2023 in preop evaluation (not available to me at this time).  Her last echocardiogram however seems to have been from 2021  She is a former smoker, has a history of right lower lobectomy in 2019, sleep apnea, COPD.  Most recently in 2/24, she had COVID-pneumonia. She has had chronic left lower extremity edema for 3 years, had a negative lower extremity ultrasound for DVT in 2021.  She has been maintained on Aldactone/HCTZ 25/25 as well as labetalol 50 x 2 for hypertension              Recently, she saw Dr. Lakhani as a new patient.  Because of urinary complaints, he took her off of the diuretics and started her on amlodipine 2.5.  She was also taken off the labetalol.  Her blood pressures were not controlled.  Her amlodipine eventually  went up to 7.5 and losartan 25 was added She said that she started developing bilateral lower extremity edema shortly after the above change in medications.              9 days ago, she saw Dr. Torres and asked for diuretic prescription and he gave her 10 days worth of Lasix 40.    Her amlodipine has also been reduced back down to 5 and her losartan increased to 50 She reports some mild improvement in her edema.  Denies any orthopnea or PND She is quite nervous over the edema.  She also reports worsening dyspnea over the last couple of months but admits that she has not been taking her rescue inhaler,   though she has also had wheezing

## 2024-04-10 LAB
CHOLEST SERPL-MCNC: 146 MG/DL
HDLC SERPL-MCNC: 58 MG/DL
LDLC SERPL CALC-MCNC: 74 MG/DL
NONHDLC SERPL-MCNC: 88 MG/DL
TRIGL SERPL-MCNC: 68 MG/DL
TSH SERPL-ACNC: 2.69 UIU/ML

## 2024-04-12 ENCOUNTER — RESULT REVIEW (OUTPATIENT)
Age: 79
End: 2024-04-12

## 2024-04-16 ENCOUNTER — APPOINTMENT (OUTPATIENT)
Dept: UROLOGY | Facility: CLINIC | Age: 79
End: 2024-04-16
Payer: MEDICARE

## 2024-04-16 VITALS
DIASTOLIC BLOOD PRESSURE: 87 MMHG | HEIGHT: 65 IN | HEART RATE: 74 BPM | RESPIRATION RATE: 18 BRPM | BODY MASS INDEX: 29.99 KG/M2 | WEIGHT: 180 LBS | TEMPERATURE: 98 F | SYSTOLIC BLOOD PRESSURE: 152 MMHG

## 2024-04-16 PROCEDURE — 52000 CYSTOURETHROSCOPY: CPT

## 2024-04-22 ENCOUNTER — APPOINTMENT (OUTPATIENT)
Dept: GERIATRICS | Facility: CLINIC | Age: 79
End: 2024-04-22
Payer: MEDICARE

## 2024-04-22 VITALS
WEIGHT: 180 LBS | BODY MASS INDEX: 29.99 KG/M2 | HEART RATE: 71 BPM | DIASTOLIC BLOOD PRESSURE: 80 MMHG | TEMPERATURE: 97.9 F | HEIGHT: 65 IN | SYSTOLIC BLOOD PRESSURE: 122 MMHG | OXYGEN SATURATION: 96 %

## 2024-04-22 DIAGNOSIS — Z13.820 ENCOUNTER FOR SCREENING FOR OSTEOPOROSIS: ICD-10-CM

## 2024-04-22 PROCEDURE — G2211 COMPLEX E/M VISIT ADD ON: CPT

## 2024-04-22 PROCEDURE — 99215 OFFICE O/P EST HI 40 MIN: CPT

## 2024-04-22 NOTE — HISTORY OF PRESENT ILLNESS
[No falls in past year] : Patient reported no falls in the past year [Completely Independent] : Completely independent. [0] : 2) Feeling down, depressed, or hopeless: Not at all (0) [Patient reported osteoporosis screening was normal] : Patient reported osteoporosis screening was normal [Patient reported hearing was normal] : Patient reported hearing was normal [Patient reported vision is abnormal] : Patient reported vision is abnormal [Patient reported Patient reported dental screening is normal] : Patient reported dental screening is normal [Patient reported colon/rectal cancer screening was abnormal] : Patient reported colon/rectal cancer screening was abnormal [Patient reported breast cancer screening was normal] : Patient reported breast cancer screening was normal [Smoke Detector] : smoke detector [Carbon Monoxide Detector] : carbon monoxide detector [Shower Chair] : shower chair [Anti-Slip Measures] : anti-slip measures [PHQ-2 Negative - No further assessment needed] : PHQ-2 Negative - No further assessment needed [FreeTextEntry1] : Had UTI in march 2024 cystoscopy on april 16th with Dr. Holm was normal  #HTN had stopped labetalol and spironolactone-hctz now on amlodipine 2.5 mg atleast 2 week and losartan 75 mg followed by cardiology on lasix 40mg QD. has not taken in last 2 days  BP running -140s at home.  #MOLST form  03/08/2024 started on inhaler by pulmonology. having trouble breathing sometimes.  Dr. Armani Damon at [facility], pulmonary medicine  take BP once a day d/c labetalol  Cardiologist Dr. Hui, Dr. Holm, urologist for recurrent UTI      LETY PETERSON is a 78 year yo White  female is coming in today to establish care. Patient has PMHx as listed below    #Stage Ib right breast cancer status post partial mastectomy 2020 Classic invasive lobular carcinoma, [place] positive HER2 FISH negative Dr. Torres oncology, Dr. Garcia Whole breast radiation, anastrozole Followed by surg, next mammogram 12/24  #Overactive bladder followed by urology; Dr. Arroyo pelvic floor exercises  renal U/S + cystoscopy urinating 6-8 times a day. 2x a night  #h/o colonic polyps and now BRBPR h/o AVMs, hemorrhoids, polyps(2019 colonoscopy); repeat in 3-5 yrs Followed by GI. dr. Frey, Colonoscopy this year.  never had chemo ever in life   #Right hip osteoarthritis THR june 2023 on Vitamin D3  #ALBINO on CPAP  #COPD with lung cancer RLL lobectomy May 2019 got pulm rehab in past  #HTN on labetalol 50mg BID, spironolactone-HCTZ 25-25 will stop today spirono-hctz. Has BP cuff at home.   #Hypothyroidism On levothyroxine  #Insomnia sleeping about 4-6hrs at night melatonin 10mg QHS; try at bednight not at 3 am  #Chronic low back pain/ Spinal stenosis  takes tylenol; Bleeds on NSAIDs; does not want PT  Education: nursing school; LPN Work: LPN; retired ETOH/Smoking: smoked 1 pack a day for 30 yrs; no alcohol Weight loss/malnutrition: no : , 2 kids Immunization:  checked Screening: low dose CT lung followed by pulm Depression screening: neg Medication reconciliation: reconciled Allergies: reconciled, wants to see allergist     4M Geriatric Screening Tests   Based on The 4Ms While Caring for Older Adults, an evidence-based focus on the key areas of mentation, mobility, medications, and what matters most (a guide by the [employer] and the [employer])     Medications: -Anticholinergic burden:      Mobility:   -Chronic pain: no -Constipation: no    Frail Scale: 3/5   -Are you fatigued? no -Can you walk up one flight of stairs? no -Can you walk one block? no -Do you have more than 5 illnesses? yes -Have you lost more than 5% of your weight in last 6 months? no       Mentation: -Hx of dementia:no -h/o delirium:no -Cognitive enhancing agents:no       Sleep:  has sleep apnea sleeping 4-6 hrs     Matters Most  []    02/29/24 She had a head cold starting 5/14.   was Sick 1 week before that.  2 weeks ago she went to the urgent care and found to be COVID-positive with x-ray of chest showing left lower lobe infiltrates.  She was started on Augmentin and doxycycline for 5 days.  She says that she is feeling better and has no fever or chills.  She does experience chills like symptoms only at night intermittently sometimes. Labs drawn at urgent care showed sodium was 134 at last visit.  She still has had cold-like symptoms where her ears feel clogged, some cough with phlegm but it does not bother her.  She does have runny nose.  She has sore throat in the morning due to CPAP machine.  She has history of right lung lobectomy secondary to lung cancer. Also has history of right-sided breast cancer status postlumpectomy. She has been diagnosed with COPD. Has completed the course of antibiotics. [BreastSonogramDate] : 07/22 [TextBox_25] : getting in july 2024 [TextBox_37] : every year, get a new optometrist [TextBox_43] : every 6 months, Sukumar Thornton [Mammogramdate] : 01/21 [TextBox_19] : To follow-up with GI; Dr. Villeda; 5 polyps each time, every 3. due july 16th [FreeTextEntry3] : 11/23 [FreeTextEntry4] : normal; followed by surgery and onc Dr. callahan, breast surgeon; 12/2024 next [FreeTextEntry7] : lung ca screen tuesday by Dr. Tran [Grab Bars] : no grab bars [Night Light] : no night light [de-identified] : getting grab bars;  [[id number]] : 0

## 2024-04-22 NOTE — PHYSICAL EXAM
[No Respiratory Distress] : no respiratory distress [No Acc Muscle Use] : no accessory muscle use [Respiration, Rhythm And Depth] : normal respiratory rhythm and effort [Auscultation Breath Sounds / Voice Sounds] : lungs were clear to auscultation bilaterally [Normal S1, S2] : normal S1 and S2 [Heart Rate And Rhythm] : heart rate was normal and rhythm regular [de-identified] : +1 pedal edema both legs uptil mid shin

## 2024-04-23 LAB
ANION GAP SERPL CALC-SCNC: 13 MMOL/L
BUN SERPL-MCNC: 15 MG/DL
CALCIUM SERPL-MCNC: 9.2 MG/DL
CHLORIDE SERPL-SCNC: 107 MMOL/L
CO2 SERPL-SCNC: 23 MMOL/L
CREAT SERPL-MCNC: 0.84 MG/DL
EGFR: 71 ML/MIN/1.73M2
GLUCOSE SERPL-MCNC: 130 MG/DL
MAGNESIUM SERPL-MCNC: 2.3 MG/DL
POTASSIUM SERPL-SCNC: 4.1 MMOL/L
SODIUM SERPL-SCNC: 143 MMOL/L

## 2024-04-24 LAB
APPEARANCE: CLEAR
BACTERIA: NEGATIVE /HPF
BILIRUBIN URINE: NEGATIVE
BLOOD URINE: NEGATIVE
CAST: 0 /LPF
COLOR: YELLOW
EPITHELIAL CELLS: 0 /HPF
GLUCOSE QUALITATIVE U: NEGATIVE MG/DL
KETONES URINE: NEGATIVE MG/DL
LEUKOCYTE ESTERASE URINE: ABNORMAL
MICROSCOPIC-UA: NORMAL
NITRITE URINE: NEGATIVE
PH URINE: 6.5
PROTEIN URINE: NEGATIVE MG/DL
RED BLOOD CELLS URINE: 4 /HPF
SPECIFIC GRAVITY URINE: 1.02
UROBILINOGEN URINE: 0.2 MG/DL
WHITE BLOOD CELLS URINE: 0 /HPF

## 2024-04-25 ENCOUNTER — APPOINTMENT (OUTPATIENT)
Dept: PULMONOLOGY | Facility: CLINIC | Age: 79
End: 2024-04-25
Payer: MEDICARE

## 2024-04-25 VITALS
HEART RATE: 79 BPM | WEIGHT: 180 LBS | TEMPERATURE: 97 F | HEIGHT: 62 IN | OXYGEN SATURATION: 95 % | DIASTOLIC BLOOD PRESSURE: 74 MMHG | BODY MASS INDEX: 33.13 KG/M2 | SYSTOLIC BLOOD PRESSURE: 153 MMHG

## 2024-04-25 DIAGNOSIS — U07.1 COVID-19: ICD-10-CM

## 2024-04-25 PROCEDURE — 99215 OFFICE O/P EST HI 40 MIN: CPT

## 2024-04-25 PROCEDURE — G2211 COMPLEX E/M VISIT ADD ON: CPT

## 2024-04-25 PROCEDURE — 99205 OFFICE O/P NEW HI 60 MIN: CPT

## 2024-04-25 NOTE — PHYSICAL EXAM
[No Acute Distress] : no acute distress [Normal Appearance] : normal appearance [Normal Rate/Rhythm] : normal rate/rhythm [Normal S1, S2] : normal s1, s2 [No Resp Distress] : no resp distress [Clear to Auscultation Bilaterally] : clear to auscultation bilaterally [No Abnormalities] : no abnormalities [No Clubbing] : no clubbing [1+ Pitting] : 1+ pitting [No Focal Deficits] : no focal deficits [Oriented x3] : oriented x3 [Normal Affect] : normal affect

## 2024-04-25 NOTE — REASON FOR VISIT
[Initial] : an initial visit [Lung Cancer] : lung cancer [Pneumonia] : pneumonia [Emphysema] : emphysema

## 2024-04-25 NOTE — HISTORY OF PRESENT ILLNESS
[TextBox_4] : 79 year old female with Hx of lung CA s/p RtLL resection in 2020 followed by radiation therapy, emphysema came in for initial evaluation Recent CT chest seen, my read: upper lobes emphysema, biapical scarring, RtLL resection, new infiltrate in the LtUL  She c/o dyspnea on exertion for many years. In February she had Covid and she took ABX. She had more dyspnea at that time with fever. Dyspnea improved after few days She has wheezing for 2 years but does not take any inhalers  She was prescribed Incruse but she did not use it She also has b/l leg swelling for the past few months  She is on PAP 5020 cm h2O Compliance 100% Average use 7 hrs 17 min Leak 28 AHI 0.3 PFT's seen, my read: moderate airways obstruction, mild decrease DLCO AHI 63  Old records reviewed. Last CXR at urgent care: LtLL infiltrate  SHX: quit 18 years ago

## 2024-04-30 ENCOUNTER — RESULT REVIEW (OUTPATIENT)
Age: 79
End: 2024-04-30

## 2024-05-01 RX ORDER — AMLODIPINE BESYLATE 2.5 MG/1
2.5 TABLET ORAL DAILY
Qty: 90 | Refills: 3 | Status: DISCONTINUED | COMMUNITY
Start: 2024-03-21 | End: 2024-05-01

## 2024-05-07 ENCOUNTER — APPOINTMENT (OUTPATIENT)
Dept: CARDIOLOGY | Facility: CLINIC | Age: 79
End: 2024-05-07
Payer: MEDICARE

## 2024-05-07 VITALS
WEIGHT: 182 LBS | DIASTOLIC BLOOD PRESSURE: 77 MMHG | BODY MASS INDEX: 33.29 KG/M2 | HEART RATE: 76 BPM | OXYGEN SATURATION: 97 % | SYSTOLIC BLOOD PRESSURE: 157 MMHG

## 2024-05-07 DIAGNOSIS — Z92.89 PERSONAL HISTORY OF OTHER MEDICAL TREATMENT: ICD-10-CM

## 2024-05-07 PROCEDURE — 99214 OFFICE O/P EST MOD 30 MIN: CPT

## 2024-05-09 PROBLEM — Z92.89 HISTORY OF NUCLEAR STRESS TEST: Status: RESOLVED | Noted: 2024-04-09 | Resolved: 2024-05-09

## 2024-05-09 NOTE — PHYSICAL EXAM
[No Acute Distress] : no acute distress [Obese] : obese [Normal Conjunctiva] : normal conjunctiva [Normal Venous Pressure] : normal venous pressure [No Carotid Bruit] : no carotid bruit [Normal S1, S2] : normal S1, S2 [No Murmur] : no murmur [No Rub] : no rub [No Gallop] : no gallop [Clear Lung Fields] : clear lung fields [Good Air Entry] : good air entry [No Respiratory Distress] : no respiratory distress  [Soft] : abdomen soft [Non Tender] : non-tender [No Masses/organomegaly] : no masses/organomegaly [Normal Bowel Sounds] : normal bowel sounds [Normal Gait] : normal gait [No Cyanosis] : no cyanosis [No Clubbing] : no clubbing [No Varicosities] : no varicosities [Edema ___] : edema [unfilled] [No Rash] : no rash [No Skin Lesions] : no skin lesions [Moves all extremities] : moves all extremities [No Focal Deficits] : no focal deficits [Normal Speech] : normal speech [Alert and Oriented] : alert and oriented [Normal memory] : normal memory [de-identified] : Spider veins

## 2024-05-09 NOTE — HISTORY OF PRESENT ILLNESS
[FreeTextEntry1] : Taking lasix 40 QOD Amlodipine stopped and losartan increased to 100, about 1 1/2 week ago  Chlorthalidone called in but not yet started  GURJIT is better, but wt stable  Breathing is not good

## 2024-05-09 NOTE — REASON FOR VISIT
[Symptom and Test Evaluation] : symptom and test evaluation [Hypertension] : hypertension [FreeTextEntry3] : Dr Brenna Cordova

## 2024-05-14 ENCOUNTER — APPOINTMENT (OUTPATIENT)
Dept: CARDIOLOGY | Facility: CLINIC | Age: 79
End: 2024-05-14

## 2024-05-15 ENCOUNTER — APPOINTMENT (OUTPATIENT)
Dept: GERIATRICS | Facility: CLINIC | Age: 79
End: 2024-05-15
Payer: MEDICARE

## 2024-05-15 VITALS
SYSTOLIC BLOOD PRESSURE: 142 MMHG | HEART RATE: 68 BPM | TEMPERATURE: 97.8 F | HEIGHT: 62 IN | BODY MASS INDEX: 33.49 KG/M2 | WEIGHT: 182 LBS | OXYGEN SATURATION: 98 % | DIASTOLIC BLOOD PRESSURE: 100 MMHG

## 2024-05-15 DIAGNOSIS — N32.81 OVERACTIVE BLADDER: ICD-10-CM

## 2024-05-15 DIAGNOSIS — D50.0 IRON DEFICIENCY ANEMIA SECONDARY TO BLOOD LOSS (CHRONIC): ICD-10-CM

## 2024-05-15 PROCEDURE — 99214 OFFICE O/P EST MOD 30 MIN: CPT

## 2024-05-15 PROCEDURE — G0444 DEPRESSION SCREEN ANNUAL: CPT | Mod: 59

## 2024-05-15 PROCEDURE — G2211 COMPLEX E/M VISIT ADD ON: CPT

## 2024-05-15 RX ORDER — CHLORTHALIDONE 25 MG/1
25 TABLET ORAL DAILY
Qty: 45 | Refills: 0 | Status: DISCONTINUED | COMMUNITY
Start: 2024-05-01 | End: 2024-05-15

## 2024-05-15 NOTE — HISTORY OF PRESENT ILLNESS
[Patient reported osteoporosis screening was normal] : Patient reported osteoporosis screening was normal [Patient reported hearing was normal] : Patient reported hearing was normal [Patient reported vision is abnormal] : Patient reported vision is abnormal [Patient reported Patient reported dental screening is normal] : Patient reported dental screening is normal [Patient reported colon/rectal cancer screening was abnormal] : Patient reported colon/rectal cancer screening was abnormal [Patient reported breast cancer screening was normal] : Patient reported breast cancer screening was normal [No falls in past year] : Patient reported no falls in the past year [0] : 2) Feeling down, depressed, or hopeless: Not at all (0) [PHQ-2 Negative - No further assessment needed] : PHQ-2 Negative - No further assessment needed [I have developed a follow-up plan documented below in the note.] : I have developed a follow-up plan documented below in the note. [FreeTextEntry1] : Patient is a 79-year-old female with past medical history of recurrent UTIs, hypertension, right sided breast cancer s/p mastectomy, AVMs, hypothyroidism, insomnia, ALBINO, CAD Coming in for hypertension follow-up. Per patient SBP have been running -170 at home. Has been having headache.  #Hypertension Was still taking chlorthalidone 12.5, losartan 100, lasix 40 QOD,  has not started aldactone 50 which cardiology recommended.   Patient is not taking aspirin due to h/o AVMs and GI bleed in past.  Wants to see urogyn. Referred Dr. Ernandez  July colonoscopy every 3 yrs Dr. Frey scheduled   022/2024 Had UTI in march 2024 cystoscopy on april 16th with Dr. Holm was normal  #HTN had stopped labetalol and spironolactone-hctz now on amlodipine 2.5 mg atleast 2 week and losartan 75 mg followed by cardiology on lasix 40mg QD. has not taken in last 2 days  BP running -140s at home.  #MOLST form  03/08/2024 started on inhaler by pulmonology. having trouble breathing sometimes.  [facility]  take BP once a day d/c labetalol  Cardiologist Dr. Hui, Dr. Holm, urologist for recurrent UTI      LETY PETERSON is a 78 year yo White  female is coming in today to establish care. Patient has PMHx as listed below    #Stage Ib right breast cancer status post partial mastectomy 2020 Classic invasive lobular carcinoma, ER/[place] positive HER2 FISH negative Dr. Torres oncology, Dr. Garcia Whole breast radiation, anastrozole Followed by surg, next mammogram 12/24  #Overactive bladder followed by urology; Dr. Arroyo pelvic floor exercises  renal U/S + cystoscopy urinating 6-8 times a day. 2x a night  #h/o colonic polyps and now BRBPR h/o AVMs, hemorrhoids, polyps(2019 colonoscopy); repeat in 3-5 yrs Followed by GI. dr. Frey, Colonoscopy this year.  never had chemo ever in life   #Right hip osteoarthritis THR june 2023 on Vitamin D3  #ALBINO on CPAP  #COPD with lung cancer RLL lobectomy May 2019 got pulm rehab in past  #HTN on labetalol 50mg BID, spironolactone-HCTZ 25-25 will stop today spirono-hctz. Has BP cuff at home.   #Hypothyroidism On levothyroxine  #Insomnia sleeping about 4-6hrs at night melatonin 10mg QHS; try at bednight not at 3 am  #Chronic low back pain/ Spinal stenosis  takes tylenol; Bleeds on NSAIDs; does not want PT  Education: nursing school; LPN Work: LPN; retired ETOH/Smoking: smoked 1 pack a day for 30 yrs; no alcohol Weight loss/malnutrition: no : , 2 kids Immunization:  checked Screening: low dose CT lung followed by pulm Depression screening: neg Medication reconciliation: reconciled Allergies: reconciled, wants to see allergist     4M Geriatric Screening Tests   Based on The 4Ms While Caring for Older Adults, an evidence-based focus on the key areas of mentation, mobility, medications, and what matters most (a guide by the [place] [employer] and the [employer])     Medications: -Anticholinergic burden:      Mobility:   -Chronic pain: no -Constipation: no    Frail Scale: 3/5   -Are you fatigued? yes -Can you walk up one flight of stairs? no -Can you walk one block? no -Do you have more than 5 illnesses? yes -Have you lost more than 5% of your weight in last 6 months? no       Mentation: -Hx of dementia:no -h/o delirium:no -Cognitive enhancing agents:no       Sleep:  has sleep apnea sleeping 4-6 hrs     Matters Most  []    02/29/24 She had a head cold starting 5/14.   was Sick 1 week before that.  2 weeks ago she went to the urgent care and found to be COVID-positive with x-ray of chest showing left lower lobe infiltrates.  She was started on Augmentin and doxycycline for 5 days.  She says that she is feeling better and has no fever or chills.  She does experience chills like symptoms only at night intermittently sometimes. Labs drawn at urgent care showed sodium was 134 at last visit.  She still has had cold-like symptoms where her ears feel clogged, some cough with phlegm but it does not bother her.  She does have runny nose.  She has sore throat in the morning due to CPAP machine.  She has history of right lung lobectomy secondary to lung cancer. Also has history of right-sided breast cancer status postlumpectomy. She has been diagnosed with COPD. Has completed the course of antibiotics. [BreastSonogramDate] : 07/22 [TextBox_25] : getting in july 2024 [TextBox_37] : every year, get a new optometrist [TextBox_43] : every 6 months, Sukumar Thornton [Mammogramdate] : 01/21 [TextBox_19] : To follow-up with GI; Dr. Villeda; 5 polyps each time, every 3. due july 16th [FreeTextEntry3] : 11/23 [FreeTextEntry4] : normal; followed by surgery and onc Dr. callahan, breast surgeon; 12/2024 next [FreeTextEntry7] : lung ca screen tuesday by Dr. Tran [[id number]] : 0

## 2024-05-17 ENCOUNTER — APPOINTMENT (OUTPATIENT)
Dept: BREAST CENTER | Facility: CLINIC | Age: 79
End: 2024-05-17

## 2024-05-21 RX ORDER — LOSARTAN POTASSIUM 100 MG/1
100 TABLET, FILM COATED ORAL
Qty: 90 | Refills: 1 | Status: ACTIVE | COMMUNITY
Start: 2024-03-21

## 2024-05-31 ENCOUNTER — APPOINTMENT (OUTPATIENT)
Dept: PULMONOLOGY | Facility: CLINIC | Age: 79
End: 2024-05-31
Payer: MEDICARE

## 2024-05-31 VITALS
TEMPERATURE: 97 F | SYSTOLIC BLOOD PRESSURE: 168 MMHG | OXYGEN SATURATION: 92 % | WEIGHT: 182 LBS | DIASTOLIC BLOOD PRESSURE: 76 MMHG | HEART RATE: 74 BPM | HEIGHT: 62 IN | BODY MASS INDEX: 33.49 KG/M2

## 2024-05-31 DIAGNOSIS — J44.9 CHRONIC OBSTRUCTIVE PULMONARY DISEASE, UNSPECIFIED: ICD-10-CM

## 2024-05-31 DIAGNOSIS — C34.31 MALIGNANT NEOPLASM OF LOWER LOBE, RIGHT BRONCHUS OR LUNG: ICD-10-CM

## 2024-05-31 DIAGNOSIS — G47.33 OBSTRUCTIVE SLEEP APNEA (ADULT) (PEDIATRIC): ICD-10-CM

## 2024-05-31 PROCEDURE — 99214 OFFICE O/P EST MOD 30 MIN: CPT

## 2024-05-31 PROCEDURE — G2211 COMPLEX E/M VISIT ADD ON: CPT

## 2024-05-31 NOTE — HISTORY OF PRESENT ILLNESS
[TextBox_4] : 79 year old female with COPD, ALBINO on PAP, lung CA s/p resection recent pneumonia came for f/u Last visit she was started on Anoro Echo did not show PH 6 MWT with desaturations to 88% PFT's with moderate airways obstruction with moderate decrease DLCO  She did not start Anoro yet. She forgot to pick it up from pharmacy. Has the same dyspnea on exertion. Has b/l leg edema No cough, no hemoptysis No hospitalizations since the last visit

## 2024-05-31 NOTE — REASON FOR VISIT
[Follow-Up] : a follow-up visit [Lung Cancer] : lung cancer [Sleep Apnea] : sleep apnea [Pneumonia] : pneumonia [COPD] : COPD

## 2024-05-31 NOTE — PHYSICAL EXAM
[No Acute Distress] : no acute distress [Normal Appearance] : normal appearance [Normal Rate/Rhythm] : normal rate/rhythm [Normal S1, S2] : normal s1, s2 [No Resp Distress] : no resp distress [Clear to Auscultation Bilaterally] : clear to auscultation bilaterally [No Abnormalities] : no abnormalities [Normal Gait] : normal gait [No Clubbing] : no clubbing [No Edema] : no edema [No Focal Deficits] : no focal deficits [Oriented x3] : oriented x3 [Normal Affect] : normal affect

## 2024-06-03 RX ORDER — UMECLIDINIUM BROMIDE AND VILANTEROL TRIFENATATE 62.5; 25 UG/1; UG/1
62.5-25 POWDER RESPIRATORY (INHALATION)
Qty: 3 | Refills: 1 | Status: ACTIVE | COMMUNITY
Start: 2024-04-25 | End: 1900-01-01

## 2024-06-06 ENCOUNTER — RESULT REVIEW (OUTPATIENT)
Age: 79
End: 2024-06-06

## 2024-06-14 ENCOUNTER — RESULT REVIEW (OUTPATIENT)
Age: 79
End: 2024-06-14

## 2024-06-18 ENCOUNTER — RESULT REVIEW (OUTPATIENT)
Age: 79
End: 2024-06-18

## 2024-06-21 ENCOUNTER — APPOINTMENT (OUTPATIENT)
Dept: CARDIOLOGY | Facility: CLINIC | Age: 79
End: 2024-06-21
Payer: MEDICARE

## 2024-06-21 VITALS
SYSTOLIC BLOOD PRESSURE: 137 MMHG | OXYGEN SATURATION: 95 % | BODY MASS INDEX: 33.13 KG/M2 | HEART RATE: 72 BPM | WEIGHT: 180 LBS | DIASTOLIC BLOOD PRESSURE: 74 MMHG | HEIGHT: 62 IN

## 2024-06-21 DIAGNOSIS — I25.10 ATHEROSCLEROTIC HEART DISEASE OF NATIVE CORONARY ARTERY W/OUT ANGINA PECTORIS: ICD-10-CM

## 2024-06-21 DIAGNOSIS — E78.5 HYPERLIPIDEMIA, UNSPECIFIED: ICD-10-CM

## 2024-06-21 DIAGNOSIS — Z92.89 PERSONAL HISTORY OF OTHER MEDICAL TREATMENT: ICD-10-CM

## 2024-06-21 DIAGNOSIS — I10 ESSENTIAL (PRIMARY) HYPERTENSION: ICD-10-CM

## 2024-06-21 DIAGNOSIS — R06.02 SHORTNESS OF BREATH: ICD-10-CM

## 2024-06-21 DIAGNOSIS — R60.0 LOCALIZED EDEMA: ICD-10-CM

## 2024-06-21 DIAGNOSIS — I44.7 LEFT BUNDLE-BRANCH BLOCK, UNSPECIFIED: ICD-10-CM

## 2024-06-21 PROCEDURE — 99214 OFFICE O/P EST MOD 30 MIN: CPT

## 2024-06-21 RX ORDER — SPIRONOLACTONE 50 MG/1
50 TABLET ORAL DAILY
Qty: 90 | Refills: 3 | Status: ACTIVE | COMMUNITY
Start: 2024-05-09 | End: 1900-01-01

## 2024-06-21 NOTE — PHYSICAL EXAM
[No Acute Distress] : no acute distress [Obese] : obese [Normal Conjunctiva] : normal conjunctiva [Normal Venous Pressure] : normal venous pressure [No Carotid Bruit] : no carotid bruit [Normal S1, S2] : normal S1, S2 [No Murmur] : no murmur [No Rub] : no rub [No Gallop] : no gallop [Clear Lung Fields] : clear lung fields [Good Air Entry] : good air entry [No Respiratory Distress] : no respiratory distress  [Soft] : abdomen soft [No Masses/organomegaly] : no masses/organomegaly [Non Tender] : non-tender [Normal Bowel Sounds] : normal bowel sounds [Normal Gait] : normal gait [No Cyanosis] : no cyanosis [No Clubbing] : no clubbing [No Varicosities] : no varicosities [Edema ___] : edema [unfilled] [No Rash] : no rash [No Skin Lesions] : no skin lesions [Moves all extremities] : moves all extremities [No Focal Deficits] : no focal deficits [Normal Speech] : normal speech [Alert and Oriented] : alert and oriented [Normal memory] : normal memory [de-identified] : Spider veins

## 2024-06-21 NOTE — HISTORY OF PRESENT ILLNESS
[FreeTextEntry1] : Doing ok  Beeter wit anoro ellipta GURJIT is better  Can't take ASA due to AVMs (even ibuprofen makes her bleed)  BPs mostly 140s/160s/80s-90s

## 2024-07-08 ENCOUNTER — RESULT REVIEW (OUTPATIENT)
Age: 79
End: 2024-07-08

## 2024-07-15 ENCOUNTER — RESULT REVIEW (OUTPATIENT)
Age: 79
End: 2024-07-15

## 2024-07-16 ENCOUNTER — APPOINTMENT (OUTPATIENT)
Dept: GASTROENTEROLOGY | Facility: HOSPITAL | Age: 79
End: 2024-07-16

## 2024-08-15 ENCOUNTER — APPOINTMENT (OUTPATIENT)
Dept: CARDIOLOGY | Facility: CLINIC | Age: 79
End: 2024-08-15
Payer: MEDICARE

## 2024-08-15 VITALS
HEART RATE: 74 BPM | OXYGEN SATURATION: 95 % | DIASTOLIC BLOOD PRESSURE: 60 MMHG | WEIGHT: 188 LBS | BODY MASS INDEX: 34.39 KG/M2 | SYSTOLIC BLOOD PRESSURE: 116 MMHG

## 2024-08-15 DIAGNOSIS — E78.5 HYPERLIPIDEMIA, UNSPECIFIED: ICD-10-CM

## 2024-08-15 DIAGNOSIS — I25.10 ATHEROSCLEROTIC HEART DISEASE OF NATIVE CORONARY ARTERY W/OUT ANGINA PECTORIS: ICD-10-CM

## 2024-08-15 DIAGNOSIS — Z92.89 PERSONAL HISTORY OF OTHER MEDICAL TREATMENT: ICD-10-CM

## 2024-08-15 DIAGNOSIS — I10 ESSENTIAL (PRIMARY) HYPERTENSION: ICD-10-CM

## 2024-08-15 DIAGNOSIS — I44.7 LEFT BUNDLE-BRANCH BLOCK, UNSPECIFIED: ICD-10-CM

## 2024-08-15 PROCEDURE — 99213 OFFICE O/P EST LOW 20 MIN: CPT

## 2024-08-20 NOTE — PHYSICAL EXAM
[No Acute Distress] : no acute distress [Obese] : obese [Normal Conjunctiva] : normal conjunctiva [Normal Venous Pressure] : normal venous pressure [No Carotid Bruit] : no carotid bruit [Normal S1, S2] : normal S1, S2 [No Murmur] : no murmur [No Rub] : no rub [No Gallop] : no gallop [Clear Lung Fields] : clear lung fields [Good Air Entry] : good air entry [No Respiratory Distress] : no respiratory distress  [Soft] : abdomen soft [Non Tender] : non-tender [No Masses/organomegaly] : no masses/organomegaly [Normal Bowel Sounds] : normal bowel sounds [Normal Gait] : normal gait [No Cyanosis] : no cyanosis [No Clubbing] : no clubbing [No Varicosities] : no varicosities [Edema ___] : edema [unfilled] [No Rash] : no rash [No Skin Lesions] : no skin lesions [Moves all extremities] : moves all extremities [No Focal Deficits] : no focal deficits [Normal Speech] : normal speech [Alert and Oriented] : alert and oriented [Normal memory] : normal memory [de-identified] : Spider veins

## 2024-08-20 NOTE — REASON FOR VISIT
[Hyperlipidemia] : hyperlipidemia [Coronary Artery Disease] : coronary artery disease [FreeTextEntry3] : Dr Lakhani

## 2024-08-20 NOTE — HISTORY OF PRESENT ILLNESS
[FreeTextEntry1] : Doing ok  Having lots of muscle pain -. started in 2/24 after being put on new meds She thinks the anoro elllipta ma be responsible for the neck pain .  She has arthritis in the neck   Takes 10 mg of melatonin -. helps her sleep  Asking to reduce spironolactone to 25 due to increased urination

## 2024-08-20 NOTE — PHYSICAL EXAM
[No Acute Distress] : no acute distress [Obese] : obese [Normal Conjunctiva] : normal conjunctiva [Normal Venous Pressure] : normal venous pressure [No Carotid Bruit] : no carotid bruit [Normal S1, S2] : normal S1, S2 [No Murmur] : no murmur [No Rub] : no rub [No Gallop] : no gallop [Clear Lung Fields] : clear lung fields [Good Air Entry] : good air entry [No Respiratory Distress] : no respiratory distress  [Soft] : abdomen soft [Non Tender] : non-tender [No Masses/organomegaly] : no masses/organomegaly [Normal Bowel Sounds] : normal bowel sounds [Normal Gait] : normal gait [No Cyanosis] : no cyanosis [No Clubbing] : no clubbing [No Varicosities] : no varicosities [Edema ___] : edema [unfilled] [No Rash] : no rash [No Skin Lesions] : no skin lesions [Moves all extremities] : moves all extremities [No Focal Deficits] : no focal deficits [Normal Speech] : normal speech [Alert and Oriented] : alert and oriented [Normal memory] : normal memory [de-identified] : Spider veins

## 2024-09-09 ENCOUNTER — APPOINTMENT (OUTPATIENT)
Dept: GERIATRICS | Facility: CLINIC | Age: 79
End: 2024-09-09
Payer: MEDICARE

## 2024-09-09 VITALS
WEIGHT: 188 LBS | TEMPERATURE: 98 F | HEART RATE: 75 BPM | HEIGHT: 62 IN | SYSTOLIC BLOOD PRESSURE: 142 MMHG | OXYGEN SATURATION: 95 % | BODY MASS INDEX: 34.6 KG/M2 | DIASTOLIC BLOOD PRESSURE: 86 MMHG

## 2024-09-09 DIAGNOSIS — G47.00 INSOMNIA, UNSPECIFIED: ICD-10-CM

## 2024-09-09 DIAGNOSIS — T73.3XXS EXHAUSTION DUE TO EXCESSIVE EXERTION, SEQUELA: ICD-10-CM

## 2024-09-09 DIAGNOSIS — R53.83 OTHER FATIGUE: ICD-10-CM

## 2024-09-09 DIAGNOSIS — I10 ESSENTIAL (PRIMARY) HYPERTENSION: ICD-10-CM

## 2024-09-09 PROCEDURE — 99215 OFFICE O/P EST HI 40 MIN: CPT

## 2024-09-09 PROCEDURE — G0444 DEPRESSION SCREEN ANNUAL: CPT | Mod: 59

## 2024-09-09 PROCEDURE — G2211 COMPLEX E/M VISIT ADD ON: CPT

## 2024-09-09 RX ORDER — RAMELTEON 8 MG/1
8 TABLET ORAL
Qty: 30 | Refills: 1 | Status: ACTIVE | COMMUNITY
Start: 2024-09-09 | End: 1900-01-01

## 2024-09-09 NOTE — HISTORY OF PRESENT ILLNESS
[Patient reported osteoporosis screening was normal] : Patient reported osteoporosis screening was normal [Patient reported hearing was normal] : Patient reported hearing was normal [Patient reported vision is abnormal] : Patient reported vision is abnormal [Patient reported Patient reported dental screening is normal] : Patient reported dental screening is normal [Patient reported colon/rectal cancer screening was abnormal] : Patient reported colon/rectal cancer screening was abnormal [Patient reported breast cancer screening was normal] : Patient reported breast cancer screening was normal [No falls in past year] : Patient reported no falls in the past year [0] : 2) Feeling down, depressed, or hopeless: Not at all (0) [PHQ-2 Negative - No further assessment needed] : PHQ-2 Negative - No further assessment needed [I have developed a follow-up plan documented below in the note.] : I have developed a follow-up plan documented below in the note. [FreeTextEntry1] : Patient is a 79-year-old female with past medical history of recurrent UTIs, hypertension, right sided breast cancer s/p mastectomy, AVMs, hypothyroidism, insomnia, ALBINO, CAD Patient has been quite frustrated with data leak.  She also feels fatigued since more than 1 year and has aches and pains in her hip joint, shoulder joint and neck.  She felt that Anoro inhaler was causing her neck pain so she stopped it 2 weeks ago.  Her neck pain has not gotten much better thus she will consider restarting it.  She went to urogynecology who gave her cranberry tablets which have been helping prevent UTIs. Her Medicare wellness visit was in March 2024.  She is wondering if we should continue checking her lab work frequently.  Discussed that since she has been on most of her medications chronically we do not need to monitor more than once or twice a year.  She does have high cardiovascular disease risk and follows with cardiology. CT coronary ca scoring:  Total Agatston Score: 511.   LM Agatston Score: 0.  LAD Agatston Score: 229.  LCX Agatston Score: 7.  RCA Agatston Score: 275.  April 4, 2024 Coming in for hypertension follow-up. Per patient SBP have been running -170 at home. Has been having headache.  #Hypertension Was still taking chlorthalidone 12.5, losartan 100, lasix 40 QOD,  has not started aldactone 50 which cardiology recommended.   Patient is not taking aspirin due to h/o AVMs and GI bleed in past.  Wants to see urogyn. Referred Dr. Ernandez  July colonoscopy every 3 yrs Dr. Frey scheduled   022/2024 Had UTI in march 2024 cystoscopy on april 16th with Dr. Holm was normal  #HTN had stopped labetalol and spironolactone-hctz now on amlodipine 2.5 mg atleast 2 week and losartan 75 mg followed by cardiology on lasix 40mg QD. has not taken in last 2 days  BP running -140s at home.  #MOLST form  03/08/2024 started on inhaler by pulmonology. having trouble breathing sometimes.  Dr. Armani Damon at [facility], pulmonary medicine  take BP once a day d/c labetalol  Cardiologist Dr. Hui, Dr. Holm, urologist for recurrent UTI      LETYRUSS TRUJILLOOGROSSI is a 78 year yo White  female is coming in today to establish care. Patient has PMHx as listed below    #Stage Ib right breast cancer status post partial mastectomy 2020 Classic invasive lobular carcinoma, ER/SD positive HER2 FISH negative Dr. Torres oncology, Dr. Garcia Whole breast radiation, anastrozole Followed by surg, next mammogram 12/24  #Overactive bladder followed by urology; Dr. Arroyo pelvic floor exercises  renal U/S + cystoscopy urinating 6-8 times a day. 2x a night  #h/o colonic polyps and now BRBPR h/o AVMs, hemorrhoids, polyps(2019 colonoscopy); repeat in 3-5 yrs Followed by GI. dr. Frey, Colonoscopy this year.  never had chemo ever in life   #Right hip osteoarthritis THR june 2023 on Vitamin D3  #ALBINO on CPAP  #COPD with lung cancer RLL lobectomy May 2019 got pulm rehab in past  #HTN on labetalol 50mg BID, spironolactone-HCTZ 25-25 will stop today spirono-hctz. Has BP cuff at home.   #Hypothyroidism On levothyroxine  #Insomnia sleeping about 4-6hrs at night melatonin 10mg QHS; try at bednight not at 3 am  #Chronic low back pain/ Spinal stenosis  takes tylenol; Bleeds on NSAIDs; does not want PT  Education: nursing school; LPN Work: LPN; retired ETOH/Smoking: smoked 1 pack a day for 30 yrs; no alcohol Weight loss/malnutrition: no : , 2 kids Immunization:  checked Screening: low dose CT lung followed by pulm Depression screening: neg Medication reconciliation: reconciled Allergies: reconciled, wants to see allergist     4M Geriatric Screening Tests   Based on The 4Ms While Caring for Older Adults, an evidence-based focus on the key areas of mentation, mobility, medications, and what matters most (a guide by the [employer] and the [employer])     Medications: -Anticholinergic burden:      Mobility:   -Chronic pain: no -Constipation: no    Frail Scale: 3/5   -Are you fatigued? yes -Can you walk up one flight of stairs? no -Can you walk one block? no -Do you have more than 5 illnesses? yes -Have you lost more than 5% of your weight in last 6 months? no       Mentation: -Hx of dementia:no -h/o delirium:no -Cognitive enhancing agents:no       Sleep:  has sleep apnea sleeping 4-6 hrs     Matters Most  []    02/29/24 She had a head cold starting 5/14.   was Sick 1 week before that.  2 weeks ago she went to the urgent care and found to be COVID-positive with x-ray of chest showing left lower lobe infiltrates.  She was started on Augmentin and doxycycline for 5 days.  She says that she is feeling better and has no fever or chills.  She does experience chills like symptoms only at night intermittently sometimes. Labs drawn at urgent care showed sodium was 134 at last visit.  She still has had cold-like symptoms where her ears feel clogged, some cough with phlegm but it does not bother her.  She does have runny nose.  She has sore throat in the morning due to CPAP machine.  She has history of right lung lobectomy secondary to lung cancer. Also has history of right-sided breast cancer status postlumpectomy. She has been diagnosed with COPD. Has completed the course of antibiotics. [BreastSonogramDate] : 07/24 [TextBox_25] : normal no [TextBox_37] : every year, get a new optometrist [TextBox_43] : every 6 months, Sukumar Thornton [Mammogramdate] : 01/21 [TextBox_19] : To follow-up with GI; Dr. Villeda; 5 polyps each time, every 3. due july 16th [FreeTextEntry3] : 11/23 [FreeTextEntry4] : normal; followed by surgery and onc Dr. callahan, breast surgeon; 12/2024 next [FreeTextEntry7] : lung ca screen tuesday by Dr. Tran [[id number]] : 0

## 2024-09-10 LAB
ALBUMIN SERPL ELPH-MCNC: 4.7 G/DL
ALP BLD-CCNC: 101 U/L
ALT SERPL-CCNC: 20 U/L
ANION GAP SERPL CALC-SCNC: 13 MMOL/L
AST SERPL-CCNC: 21 U/L
BILIRUB SERPL-MCNC: 0.3 MG/DL
BUN SERPL-MCNC: 16 MG/DL
CALCIUM SERPL-MCNC: 10 MG/DL
CHLORIDE SERPL-SCNC: 104 MMOL/L
CK SERPL-CCNC: 261 U/L
CO2 SERPL-SCNC: 24 MMOL/L
CREAT SERPL-MCNC: 0.94 MG/DL
CRP SERPL-MCNC: 4 MG/L
EGFR: 62 ML/MIN/1.73M2
ERYTHROCYTE [SEDIMENTATION RATE] IN BLOOD BY WESTERGREN METHOD: 33 MM/HR
GLUCOSE SERPL-MCNC: 113 MG/DL
POTASSIUM SERPL-SCNC: 4.9 MMOL/L
PROT SERPL-MCNC: 7 G/DL
SODIUM SERPL-SCNC: 141 MMOL/L

## 2024-10-17 ENCOUNTER — RX RENEWAL (OUTPATIENT)
Age: 79
End: 2024-10-17

## 2024-11-04 ENCOUNTER — APPOINTMENT (OUTPATIENT)
Dept: PULMONOLOGY | Facility: CLINIC | Age: 79
End: 2024-11-04
Payer: MEDICARE

## 2024-11-04 ENCOUNTER — RESULT REVIEW (OUTPATIENT)
Age: 79
End: 2024-11-04

## 2024-11-04 VITALS
TEMPERATURE: 94 F | SYSTOLIC BLOOD PRESSURE: 133 MMHG | HEART RATE: 70 BPM | OXYGEN SATURATION: 96 % | BODY MASS INDEX: 34.6 KG/M2 | HEIGHT: 62 IN | DIASTOLIC BLOOD PRESSURE: 75 MMHG | WEIGHT: 188 LBS

## 2024-11-04 DIAGNOSIS — G47.33 OBSTRUCTIVE SLEEP APNEA (ADULT) (PEDIATRIC): ICD-10-CM

## 2024-11-04 DIAGNOSIS — J44.9 CHRONIC OBSTRUCTIVE PULMONARY DISEASE, UNSPECIFIED: ICD-10-CM

## 2024-11-04 DIAGNOSIS — G47.00 INSOMNIA, UNSPECIFIED: ICD-10-CM

## 2024-11-04 DIAGNOSIS — C34.31 MALIGNANT NEOPLASM OF LOWER LOBE, RIGHT BRONCHUS OR LUNG: ICD-10-CM

## 2024-11-04 PROCEDURE — 99215 OFFICE O/P EST HI 40 MIN: CPT

## 2024-11-04 PROCEDURE — G2211 COMPLEX E/M VISIT ADD ON: CPT

## 2024-11-04 RX ORDER — PREDNISONE 20 MG/1
20 TABLET ORAL DAILY
Qty: 10 | Refills: 0 | Status: ACTIVE | COMMUNITY
Start: 2024-11-04 | End: 1900-01-01

## 2024-11-04 RX ORDER — FLUCONAZOLE 100 MG/1
100 TABLET ORAL
Qty: 2 | Refills: 0 | Status: ACTIVE | COMMUNITY
Start: 2024-11-04 | End: 1900-01-01

## 2024-11-04 RX ORDER — AZITHROMYCIN 250 MG/1
250 TABLET, FILM COATED ORAL
Qty: 1 | Refills: 0 | Status: ACTIVE | COMMUNITY
Start: 2024-11-04 | End: 1900-01-01

## 2024-11-13 ENCOUNTER — RX RENEWAL (OUTPATIENT)
Age: 79
End: 2024-11-13

## 2024-11-22 ENCOUNTER — RESULT REVIEW (OUTPATIENT)
Age: 79
End: 2024-11-22

## 2024-11-22 ENCOUNTER — APPOINTMENT (OUTPATIENT)
Dept: HEMATOLOGY ONCOLOGY | Facility: CLINIC | Age: 79
End: 2024-11-22
Payer: MEDICARE

## 2024-11-22 VITALS
BODY MASS INDEX: 34.09 KG/M2 | HEART RATE: 66 BPM | RESPIRATION RATE: 16 BRPM | HEIGHT: 62 IN | DIASTOLIC BLOOD PRESSURE: 73 MMHG | TEMPERATURE: 98.4 F | WEIGHT: 185.25 LBS | SYSTOLIC BLOOD PRESSURE: 135 MMHG | OXYGEN SATURATION: 94 %

## 2024-11-22 DIAGNOSIS — C50.919 MALIGNANT NEOPLASM OF UNSPECIFIED SITE OF UNSPECIFIED FEMALE BREAST: ICD-10-CM

## 2024-11-22 PROCEDURE — 36415 COLL VENOUS BLD VENIPUNCTURE: CPT

## 2024-11-22 PROCEDURE — G2211 COMPLEX E/M VISIT ADD ON: CPT

## 2024-11-22 PROCEDURE — 99214 OFFICE O/P EST MOD 30 MIN: CPT

## 2024-11-22 RX ORDER — CHROMIUM 200 MCG
TABLET ORAL
Refills: 0 | Status: ACTIVE | COMMUNITY

## 2024-11-22 RX ORDER — ELECTROLYTES/DEXTROSE
SOLUTION, ORAL ORAL
Refills: 0 | Status: ACTIVE | COMMUNITY

## 2024-12-03 ENCOUNTER — RESULT REVIEW (OUTPATIENT)
Age: 79
End: 2024-12-03

## 2024-12-03 ENCOUNTER — NON-APPOINTMENT (OUTPATIENT)
Age: 79
End: 2024-12-03

## 2025-01-08 ENCOUNTER — APPOINTMENT (OUTPATIENT)
Dept: BREAST CENTER | Facility: CLINIC | Age: 80
End: 2025-01-08
Payer: MEDICARE

## 2025-01-08 ENCOUNTER — NON-APPOINTMENT (OUTPATIENT)
Age: 80
End: 2025-01-08

## 2025-01-08 VITALS
HEART RATE: 67 BPM | DIASTOLIC BLOOD PRESSURE: 73 MMHG | BODY MASS INDEX: 33.86 KG/M2 | HEIGHT: 62 IN | WEIGHT: 184 LBS | SYSTOLIC BLOOD PRESSURE: 126 MMHG

## 2025-01-08 DIAGNOSIS — Z83.3 FAMILY HISTORY OF DIABETES MELLITUS: ICD-10-CM

## 2025-01-08 DIAGNOSIS — Z12.31 ENCOUNTER FOR SCREENING MAMMOGRAM FOR MALIGNANT NEOPLASM OF BREAST: ICD-10-CM

## 2025-01-08 DIAGNOSIS — R92.2 INCONCLUSIVE MAMMOGRAM: ICD-10-CM

## 2025-01-08 DIAGNOSIS — M19.90 UNSPECIFIED OSTEOARTHRITIS, UNSPECIFIED SITE: ICD-10-CM

## 2025-01-08 DIAGNOSIS — Z87.891 PERSONAL HISTORY OF NICOTINE DEPENDENCE: ICD-10-CM

## 2025-01-08 DIAGNOSIS — Z82.49 FAMILY HISTORY OF ISCHEMIC HEART DISEASE AND OTHER DISEASES OF THE CIRCULATORY SYSTEM: ICD-10-CM

## 2025-01-08 DIAGNOSIS — Z90.11 ACQUIRED ABSENCE OF RIGHT BREAST AND NIPPLE: ICD-10-CM

## 2025-01-08 DIAGNOSIS — Z72.3 LACK OF PHYSICAL EXERCISE: ICD-10-CM

## 2025-01-08 DIAGNOSIS — R92.30 DENSE BREASTS, UNSPECIFIED: ICD-10-CM

## 2025-01-08 DIAGNOSIS — Z80.1 FAMILY HISTORY OF MALIGNANT NEOPLASM OF TRACHEA, BRONCHUS AND LUNG: ICD-10-CM

## 2025-01-08 DIAGNOSIS — Z92.3 PERSONAL HISTORY OF IRRADIATION: ICD-10-CM

## 2025-01-08 DIAGNOSIS — Z85.118 PERSONAL HISTORY OF OTHER MALIGNANT NEOPLASM OF BRONCHUS AND LUNG: ICD-10-CM

## 2025-01-08 DIAGNOSIS — C50.919 MALIGNANT NEOPLASM OF UNSPECIFIED SITE OF UNSPECIFIED FEMALE BREAST: ICD-10-CM

## 2025-01-08 PROCEDURE — 99205 OFFICE O/P NEW HI 60 MIN: CPT

## 2025-01-08 RX ORDER — YOHIMBE BARK 500 MG
CAPSULE ORAL
Refills: 0 | Status: ACTIVE | COMMUNITY

## 2025-01-08 RX ORDER — CRANBERRY FRUIT EXTRACT 200 MG
CAPSULE ORAL
Refills: 0 | Status: ACTIVE | COMMUNITY

## 2025-01-08 RX ORDER — MAGNESIUM OXIDE/MAG AA CHELATE 300 MG
CAPSULE ORAL
Refills: 0 | Status: ACTIVE | COMMUNITY

## 2025-01-08 RX ORDER — CHLORHEXIDINE GLUCONATE 4 %
LIQUID (ML) TOPICAL
Refills: 0 | Status: ACTIVE | COMMUNITY

## 2025-01-17 ENCOUNTER — APPOINTMENT (OUTPATIENT)
Dept: BREAST CENTER | Facility: CLINIC | Age: 80
End: 2025-01-17

## 2025-01-22 ENCOUNTER — RX RENEWAL (OUTPATIENT)
Age: 80
End: 2025-01-22

## 2025-01-23 DIAGNOSIS — B35.3 TINEA PEDIS: ICD-10-CM

## 2025-01-23 RX ORDER — KETOCONAZOLE 20 MG/G
2 CREAM TOPICAL DAILY
Qty: 1 | Refills: 3 | Status: ACTIVE | COMMUNITY
Start: 2025-01-23 | End: 1900-01-01

## 2025-01-27 ENCOUNTER — APPOINTMENT (OUTPATIENT)
Dept: GERIATRICS | Facility: CLINIC | Age: 80
End: 2025-01-27
Payer: MEDICARE

## 2025-01-27 VITALS
TEMPERATURE: 96.8 F | BODY MASS INDEX: 34.04 KG/M2 | WEIGHT: 185 LBS | DIASTOLIC BLOOD PRESSURE: 80 MMHG | HEART RATE: 71 BPM | HEIGHT: 62 IN | SYSTOLIC BLOOD PRESSURE: 132 MMHG | OXYGEN SATURATION: 95 %

## 2025-01-27 DIAGNOSIS — L03.031 CELLULITIS OF RIGHT TOE: ICD-10-CM

## 2025-01-27 PROCEDURE — 99214 OFFICE O/P EST MOD 30 MIN: CPT

## 2025-01-27 RX ORDER — FLUCONAZOLE 100 MG/1
100 TABLET ORAL DAILY
Qty: 2 | Refills: 0 | Status: ACTIVE | COMMUNITY
Start: 2025-01-27 | End: 1900-01-01

## 2025-01-27 RX ORDER — CEPHALEXIN 500 MG/1
500 CAPSULE ORAL
Qty: 15 | Refills: 0 | Status: ACTIVE | COMMUNITY
Start: 2025-01-27 | End: 1900-01-01

## 2025-01-28 ENCOUNTER — APPOINTMENT (OUTPATIENT)
Dept: PODIATRY | Facility: CLINIC | Age: 80
End: 2025-01-28
Payer: MEDICARE

## 2025-01-28 DIAGNOSIS — J44.9 CHRONIC OBSTRUCTIVE PULMONARY DISEASE, UNSPECIFIED: ICD-10-CM

## 2025-01-28 DIAGNOSIS — S90.821A BLISTER (NONTHERMAL), RIGHT FOOT, INITIAL ENCOUNTER: ICD-10-CM

## 2025-01-28 DIAGNOSIS — M19.272 SECONDARY OSTEOARTHRITIS, LEFT ANKLE AND FOOT: ICD-10-CM

## 2025-01-28 DIAGNOSIS — M10.471 OTHER SECONDARY GOUT, RIGHT ANKLE AND FOOT: ICD-10-CM

## 2025-01-28 DIAGNOSIS — L03.039 CELLULITIS OF UNSPECIFIED TOE: ICD-10-CM

## 2025-01-28 PROCEDURE — 73630 X-RAY EXAM OF FOOT: CPT | Mod: 50

## 2025-01-28 PROCEDURE — 99203 OFFICE O/P NEW LOW 30 MIN: CPT

## 2025-02-03 ENCOUNTER — NON-APPOINTMENT (OUTPATIENT)
Age: 80
End: 2025-02-03

## 2025-02-03 DIAGNOSIS — N63.13 UNSPECIFIED LUMP IN THE RIGHT BREAST, LOWER OUTER QUADRANT: ICD-10-CM

## 2025-02-04 ENCOUNTER — RESULT REVIEW (OUTPATIENT)
Age: 80
End: 2025-02-04

## 2025-02-04 ENCOUNTER — APPOINTMENT (OUTPATIENT)
Dept: PODIATRY | Facility: CLINIC | Age: 80
End: 2025-02-04
Payer: MEDICARE

## 2025-02-04 ENCOUNTER — NON-APPOINTMENT (OUTPATIENT)
Age: 80
End: 2025-02-04

## 2025-02-04 DIAGNOSIS — L98.8 OTHER SPECIFIED DISORDERS OF THE SKIN AND SUBCUTANEOUS TISSUE: ICD-10-CM

## 2025-02-04 DIAGNOSIS — R23.4 CHANGES IN SKIN TEXTURE: ICD-10-CM

## 2025-02-04 PROBLEM — S90.821D: Status: ACTIVE | Noted: 2025-02-04

## 2025-02-04 PROCEDURE — 99213 OFFICE O/P EST LOW 20 MIN: CPT

## 2025-02-11 ENCOUNTER — NON-APPOINTMENT (OUTPATIENT)
Age: 80
End: 2025-02-11

## 2025-02-11 ENCOUNTER — APPOINTMENT (OUTPATIENT)
Dept: CARDIOLOGY | Facility: CLINIC | Age: 80
End: 2025-02-11
Payer: MEDICARE

## 2025-02-11 VITALS
DIASTOLIC BLOOD PRESSURE: 62 MMHG | HEART RATE: 62 BPM | OXYGEN SATURATION: 97 % | SYSTOLIC BLOOD PRESSURE: 124 MMHG | WEIGHT: 187 LBS | BODY MASS INDEX: 34.2 KG/M2

## 2025-02-11 DIAGNOSIS — Z98.890 OTHER SPECIFIED POSTPROCEDURAL STATES: ICD-10-CM

## 2025-02-11 DIAGNOSIS — I25.10 ATHEROSCLEROTIC HEART DISEASE OF NATIVE CORONARY ARTERY W/OUT ANGINA PECTORIS: ICD-10-CM

## 2025-02-11 DIAGNOSIS — I44.7 LEFT BUNDLE-BRANCH BLOCK, UNSPECIFIED: ICD-10-CM

## 2025-02-11 DIAGNOSIS — R06.02 SHORTNESS OF BREATH: ICD-10-CM

## 2025-02-11 DIAGNOSIS — I10 ESSENTIAL (PRIMARY) HYPERTENSION: ICD-10-CM

## 2025-02-11 DIAGNOSIS — Z92.89 PERSONAL HISTORY OF OTHER MEDICAL TREATMENT: ICD-10-CM

## 2025-02-11 DIAGNOSIS — E78.5 HYPERLIPIDEMIA, UNSPECIFIED: ICD-10-CM

## 2025-02-11 DIAGNOSIS — R42 DIZZINESS AND GIDDINESS: ICD-10-CM

## 2025-02-11 PROCEDURE — 93246 EXT ECG>7D<15D RECORDING: CPT

## 2025-02-11 PROCEDURE — 93000 ELECTROCARDIOGRAM COMPLETE: CPT | Mod: 59

## 2025-02-11 PROCEDURE — 36415 COLL VENOUS BLD VENIPUNCTURE: CPT

## 2025-02-11 PROCEDURE — 99214 OFFICE O/P EST MOD 30 MIN: CPT

## 2025-02-12 ENCOUNTER — APPOINTMENT (OUTPATIENT)
Dept: BREAST CENTER | Facility: CLINIC | Age: 80
End: 2025-02-12
Payer: MEDICARE

## 2025-02-12 VITALS
WEIGHT: 187 LBS | BODY MASS INDEX: 34.41 KG/M2 | SYSTOLIC BLOOD PRESSURE: 114 MMHG | HEIGHT: 62 IN | DIASTOLIC BLOOD PRESSURE: 64 MMHG | HEART RATE: 75 BPM

## 2025-02-12 DIAGNOSIS — N64.4 MASTODYNIA: ICD-10-CM

## 2025-02-12 DIAGNOSIS — Z92.3 PERSONAL HISTORY OF IRRADIATION: ICD-10-CM

## 2025-02-12 PROCEDURE — 99215 OFFICE O/P EST HI 40 MIN: CPT

## 2025-02-13 ENCOUNTER — NON-APPOINTMENT (OUTPATIENT)
Age: 80
End: 2025-02-13

## 2025-02-13 VITALS — DIASTOLIC BLOOD PRESSURE: 62 MMHG | SYSTOLIC BLOOD PRESSURE: 110 MMHG

## 2025-02-13 VITALS — SYSTOLIC BLOOD PRESSURE: 110 MMHG | DIASTOLIC BLOOD PRESSURE: 60 MMHG

## 2025-02-13 PROBLEM — R42 DIZZINESS: Status: ACTIVE | Noted: 2025-02-11

## 2025-02-13 PROBLEM — Z98.890 HISTORY OF CARDIAC CATH: Status: RESOLVED | Noted: 2025-02-13 | Resolved: 2025-02-13

## 2025-02-14 ENCOUNTER — APPOINTMENT (OUTPATIENT)
Dept: PODIATRY | Facility: CLINIC | Age: 80
End: 2025-02-14
Payer: MEDICARE

## 2025-02-14 DIAGNOSIS — S90.821D BLISTER (NONTHERMAL), RIGHT FOOT, SUBSEQUENT ENCOUNTER: ICD-10-CM

## 2025-02-14 DIAGNOSIS — B35.3 TINEA PEDIS: ICD-10-CM

## 2025-02-14 DIAGNOSIS — L03.039 CELLULITIS OF UNSPECIFIED TOE: ICD-10-CM

## 2025-02-14 LAB
CK BB SERPL ELPH-CCNC: 0 % (ref 0–?)
CK MB CFR SERPL ELPH: 0 %
CK MM SERPL ELPH-CCNC: 100 %
CREATINE KINASE,TOTAL,SERUM: 190 U/L
MACRO TYPE 1: 0 %
MACRO TYPE 2: 0 %

## 2025-02-14 PROCEDURE — 99213 OFFICE O/P EST LOW 20 MIN: CPT

## 2025-02-14 RX ORDER — KETOCONAZOLE 20 MG/G
2 CREAM TOPICAL TWICE DAILY
Qty: 1 | Refills: 2 | Status: ACTIVE | COMMUNITY
Start: 2025-02-14 | End: 1900-01-01

## 2025-02-18 ENCOUNTER — NON-APPOINTMENT (OUTPATIENT)
Age: 80
End: 2025-02-18

## 2025-02-20 ENCOUNTER — APPOINTMENT (OUTPATIENT)
Dept: PODIATRY | Facility: CLINIC | Age: 80
End: 2025-02-20
Payer: MEDICARE

## 2025-02-20 DIAGNOSIS — S90.821A BLISTER (NONTHERMAL), RIGHT FOOT, INITIAL ENCOUNTER: ICD-10-CM

## 2025-02-20 PROCEDURE — 10140 I&D HMTMA SEROMA/FLUID COLLJ: CPT

## 2025-02-20 PROCEDURE — 99213 OFFICE O/P EST LOW 20 MIN: CPT | Mod: 25

## 2025-02-20 RX ORDER — SILVER SULFADIAZINE 10 MG/G
1 CREAM TOPICAL
Qty: 1 | Refills: 1 | Status: ACTIVE | COMMUNITY
Start: 2025-02-20 | End: 1900-01-01

## 2025-02-20 RX ORDER — CEPHALEXIN 500 MG/1
500 CAPSULE ORAL 3 TIMES DAILY
Qty: 21 | Refills: 1 | Status: ACTIVE | COMMUNITY
Start: 2025-02-20 | End: 1900-01-01

## 2025-02-26 LAB — BACTERIA TISS CULT: ABNORMAL

## 2025-02-26 RX ORDER — SULFAMETHOXAZOLE AND TRIMETHOPRIM 800; 160 MG/1; MG/1
800-160 TABLET ORAL TWICE DAILY
Qty: 20 | Refills: 1 | Status: ACTIVE | COMMUNITY
Start: 2025-02-26 | End: 1900-01-01

## 2025-02-27 ENCOUNTER — APPOINTMENT (OUTPATIENT)
Dept: PODIATRY | Facility: CLINIC | Age: 80
End: 2025-02-27
Payer: MEDICARE

## 2025-02-27 DIAGNOSIS — S90.821D BLISTER (NONTHERMAL), RIGHT FOOT, SUBSEQUENT ENCOUNTER: ICD-10-CM

## 2025-02-27 PROCEDURE — 99213 OFFICE O/P EST LOW 20 MIN: CPT | Mod: 24

## 2025-03-06 ENCOUNTER — NON-APPOINTMENT (OUTPATIENT)
Age: 80
End: 2025-03-06

## 2025-03-14 ENCOUNTER — APPOINTMENT (OUTPATIENT)
Dept: PULMONOLOGY | Facility: CLINIC | Age: 80
End: 2025-03-14
Payer: MEDICARE

## 2025-03-14 VITALS
HEART RATE: 80 BPM | OXYGEN SATURATION: 96 % | TEMPERATURE: 96 F | WEIGHT: 187 LBS | SYSTOLIC BLOOD PRESSURE: 97 MMHG | BODY MASS INDEX: 34.41 KG/M2 | HEIGHT: 62 IN | DIASTOLIC BLOOD PRESSURE: 57 MMHG

## 2025-03-14 DIAGNOSIS — J44.9 CHRONIC OBSTRUCTIVE PULMONARY DISEASE, UNSPECIFIED: ICD-10-CM

## 2025-03-14 DIAGNOSIS — C34.31 MALIGNANT NEOPLASM OF LOWER LOBE, RIGHT BRONCHUS OR LUNG: ICD-10-CM

## 2025-03-14 DIAGNOSIS — Z87.09 PERSONAL HISTORY OF OTHER DISEASES OF THE RESPIRATORY SYSTEM: ICD-10-CM

## 2025-03-14 DIAGNOSIS — F51.01 PRIMARY INSOMNIA: ICD-10-CM

## 2025-03-14 DIAGNOSIS — G47.33 OBSTRUCTIVE SLEEP APNEA (ADULT) (PEDIATRIC): ICD-10-CM

## 2025-03-14 PROCEDURE — G2211 COMPLEX E/M VISIT ADD ON: CPT

## 2025-03-14 PROCEDURE — 99215 OFFICE O/P EST HI 40 MIN: CPT

## 2025-03-17 ENCOUNTER — APPOINTMENT (OUTPATIENT)
Dept: PODIATRY | Facility: CLINIC | Age: 80
End: 2025-03-17
Payer: MEDICARE

## 2025-03-17 DIAGNOSIS — R23.4 CHANGES IN SKIN TEXTURE: ICD-10-CM

## 2025-03-17 DIAGNOSIS — L98.8 OTHER SPECIFIED DISORDERS OF THE SKIN AND SUBCUTANEOUS TISSUE: ICD-10-CM

## 2025-03-17 DIAGNOSIS — S90.821D BLISTER (NONTHERMAL), RIGHT FOOT, SUBSEQUENT ENCOUNTER: ICD-10-CM

## 2025-03-17 DIAGNOSIS — B35.3 TINEA PEDIS: ICD-10-CM

## 2025-03-17 PROCEDURE — 99213 OFFICE O/P EST LOW 20 MIN: CPT

## 2025-03-24 ENCOUNTER — RESULT REVIEW (OUTPATIENT)
Age: 80
End: 2025-03-24

## 2025-03-24 ENCOUNTER — APPOINTMENT (OUTPATIENT)
Dept: GERIATRICS | Facility: CLINIC | Age: 80
End: 2025-03-24
Payer: MEDICARE

## 2025-03-24 VITALS
SYSTOLIC BLOOD PRESSURE: 130 MMHG | OXYGEN SATURATION: 93 % | TEMPERATURE: 97.8 F | BODY MASS INDEX: 34.6 KG/M2 | HEIGHT: 62 IN | HEART RATE: 82 BPM | WEIGHT: 188 LBS | DIASTOLIC BLOOD PRESSURE: 74 MMHG

## 2025-03-24 DIAGNOSIS — I10 ESSENTIAL (PRIMARY) HYPERTENSION: ICD-10-CM

## 2025-03-24 DIAGNOSIS — M62.838 OTHER MUSCLE SPASM: ICD-10-CM

## 2025-03-24 DIAGNOSIS — Z71.89 OTHER SPECIFIED COUNSELING: ICD-10-CM

## 2025-03-24 DIAGNOSIS — G47.00 INSOMNIA, UNSPECIFIED: ICD-10-CM

## 2025-03-24 DIAGNOSIS — E03.9 HYPOTHYROIDISM, UNSPECIFIED: ICD-10-CM

## 2025-03-24 DIAGNOSIS — Z00.00 ENCOUNTER FOR GENERAL ADULT MEDICAL EXAMINATION W/OUT ABNORMAL FINDINGS: ICD-10-CM

## 2025-03-24 PROCEDURE — G0439: CPT

## 2025-03-24 PROCEDURE — G0444 DEPRESSION SCREEN ANNUAL: CPT | Mod: 59

## 2025-03-27 ENCOUNTER — NON-APPOINTMENT (OUTPATIENT)
Age: 80
End: 2025-03-27

## 2025-03-27 DIAGNOSIS — R42 DIZZINESS AND GIDDINESS: ICD-10-CM

## 2025-03-27 DIAGNOSIS — Z98.890 OTHER SPECIFIED POSTPROCEDURAL STATES: ICD-10-CM

## 2025-03-27 PROCEDURE — 93248 EXT ECG>7D<15D REV&INTERPJ: CPT

## 2025-04-10 ENCOUNTER — APPOINTMENT (OUTPATIENT)
Dept: GERIATRICS | Facility: CLINIC | Age: 80
End: 2025-04-10
Payer: MEDICARE

## 2025-04-10 VITALS
WEIGHT: 184 LBS | TEMPERATURE: 97.6 F | DIASTOLIC BLOOD PRESSURE: 80 MMHG | BODY MASS INDEX: 33.86 KG/M2 | SYSTOLIC BLOOD PRESSURE: 136 MMHG | HEART RATE: 84 BPM | HEIGHT: 62 IN | OXYGEN SATURATION: 94 %

## 2025-04-10 DIAGNOSIS — S09.90XA UNSPECIFIED INJURY OF HEAD, INITIAL ENCOUNTER: ICD-10-CM

## 2025-04-10 DIAGNOSIS — Z00.00 ENCOUNTER FOR GENERAL ADULT MEDICAL EXAMINATION W/OUT ABNORMAL FINDINGS: ICD-10-CM

## 2025-04-10 DIAGNOSIS — G47.62 SLEEP RELATED LEG CRAMPS: ICD-10-CM

## 2025-04-10 PROCEDURE — 99214 OFFICE O/P EST MOD 30 MIN: CPT

## 2025-04-10 PROCEDURE — G2211 COMPLEX E/M VISIT ADD ON: CPT

## 2025-04-10 RX ORDER — ADHESIVE TAPE 3"X 2.3 YD
180 MG TAPE, NON-MEDICATED TOPICAL AT BEDTIME
Qty: 180 | Refills: 0 | Status: ACTIVE | COMMUNITY
Start: 2025-04-10 | End: 1900-01-01

## 2025-04-16 ENCOUNTER — APPOINTMENT (OUTPATIENT)
Dept: GERIATRICS | Facility: CLINIC | Age: 80
End: 2025-04-16
Payer: MEDICARE

## 2025-04-16 VITALS
BODY MASS INDEX: 34.6 KG/M2 | HEIGHT: 62 IN | DIASTOLIC BLOOD PRESSURE: 74 MMHG | WEIGHT: 188 LBS | HEART RATE: 62 BPM | SYSTOLIC BLOOD PRESSURE: 136 MMHG | OXYGEN SATURATION: 93 %

## 2025-04-16 DIAGNOSIS — Z48.02 ENCOUNTER FOR REMOVAL OF SUTURES: ICD-10-CM

## 2025-04-16 DIAGNOSIS — G47.00 INSOMNIA, UNSPECIFIED: ICD-10-CM

## 2025-04-16 DIAGNOSIS — M54.9 DORSALGIA, UNSPECIFIED: ICD-10-CM

## 2025-04-16 DIAGNOSIS — M48.061 SPINAL STENOSIS, LUMBAR REGION WITHOUT NEUROGENIC CLAUDICATION: ICD-10-CM

## 2025-04-16 PROCEDURE — 99213 OFFICE O/P EST LOW 20 MIN: CPT

## 2025-05-02 ENCOUNTER — NON-APPOINTMENT (OUTPATIENT)
Age: 80
End: 2025-05-02

## 2025-06-04 DIAGNOSIS — F51.01 PRIMARY INSOMNIA: ICD-10-CM

## 2025-06-11 ENCOUNTER — APPOINTMENT (OUTPATIENT)
Dept: GERIATRICS | Facility: CLINIC | Age: 80
End: 2025-06-11
Payer: MEDICARE

## 2025-06-11 PROBLEM — Z23 ENCOUNTER FOR IMMUNIZATION: Status: ACTIVE | Noted: 2025-06-11 | Resolved: 2025-06-25

## 2025-06-11 PROCEDURE — 90684 PCV21 VACCINE IM: CPT

## 2025-06-11 PROCEDURE — G0009: CPT

## 2025-07-16 ENCOUNTER — APPOINTMENT (OUTPATIENT)
Dept: HEMATOLOGY ONCOLOGY | Facility: CLINIC | Age: 80
End: 2025-07-16

## 2025-07-17 ENCOUNTER — APPOINTMENT (OUTPATIENT)
Dept: GERIATRICS | Facility: CLINIC | Age: 80
End: 2025-07-17
Payer: MEDICARE

## 2025-07-17 PROCEDURE — 99213 OFFICE O/P EST LOW 20 MIN: CPT

## 2025-07-17 RX ORDER — CLOBETASOL PROPIONATE CREAM USP, 0.05% 0.5 MG/G
0.05 CREAM TOPICAL
Qty: 1 | Refills: 1 | Status: ACTIVE | COMMUNITY
Start: 2025-07-17 | End: 1900-01-01

## 2025-07-22 RX ORDER — PREDNISONE 10 MG/1
10 TABLET ORAL
Qty: 30 | Refills: 0 | Status: COMPLETED | COMMUNITY
Start: 2025-07-17 | End: 2025-07-22

## 2025-07-23 ENCOUNTER — APPOINTMENT (OUTPATIENT)
Dept: GERIATRICS | Facility: CLINIC | Age: 80
End: 2025-07-23
Payer: MEDICARE

## 2025-07-23 DIAGNOSIS — L23.7 ALLERGIC CONTACT DERMATITIS DUE TO PLANTS, EXCEPT FOOD: ICD-10-CM

## 2025-07-23 PROCEDURE — 99213 OFFICE O/P EST LOW 20 MIN: CPT

## 2025-07-23 PROCEDURE — G2211 COMPLEX E/M VISIT ADD ON: CPT

## 2025-07-23 RX ORDER — PRAMOXINE HYDROCHLORIDE AND ZINC ACETATE 10; 1 MG/ML; MG/ML
1-0.1 LOTION TOPICAL
Qty: 1 | Refills: 1 | Status: ACTIVE | COMMUNITY
Start: 2025-07-23 | End: 1900-01-01

## 2025-07-29 PROBLEM — L23.7 POISON IVY: Status: ACTIVE | Noted: 2025-07-17

## 2025-08-12 ENCOUNTER — RESULT REVIEW (OUTPATIENT)
Age: 80
End: 2025-08-12

## 2025-08-27 ENCOUNTER — RESULT REVIEW (OUTPATIENT)
Age: 80
End: 2025-08-27

## 2025-09-17 ENCOUNTER — APPOINTMENT (OUTPATIENT)
Dept: PULMONOLOGY | Facility: CLINIC | Age: 80
End: 2025-09-17